# Patient Record
Sex: FEMALE | Race: WHITE | NOT HISPANIC OR LATINO | Employment: FULL TIME | URBAN - NONMETROPOLITAN AREA
[De-identification: names, ages, dates, MRNs, and addresses within clinical notes are randomized per-mention and may not be internally consistent; named-entity substitution may affect disease eponyms.]

---
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
## 2018-01-03 NOTE — TELEPHONE ENCOUNTER
Patient Information     Patient Name MRSneha Ferro [id number] Female 2000      Telephone Encounter by Suad Velásquez at 3/3/2017 12:17 PM     Author:  Suad Velásquez  Service:  (none) Author Type:  (none)     Filed:  3/3/2017 12:22 PM  Encounter Date:  3/3/2017 Status:  Addendum     :  Suad Velásquez        Related Notes: Original Note by Suad Velásquez filed at 3/3/2017 12:20 PM            Having period now, can she get appointment on Monday for IUD placement?  Did explain to patient that Maria Eugenia Fall MD is only here Monday morning next week so this may not happen.   Suad Velásquez LPN .......3/3/2017 12:18 PM

## 2018-01-03 NOTE — NURSING NOTE
Patient Information     Patient Name Sneha Murry [id number] Female 2000      Nursing Note by Tyra Masterson at 2017  8:45 AM     Author:  Tyra Masterson Service:  (none) Author Type:  (none)     Filed:  2017  8:57 AM Encounter Date:  2017 Status:  Signed     :  Tyra Masterson            Patient here today for yearly physical. Would like to discuss an IUD. Is due for period this week and unsure if she should wait or not  Tyra Masterson LPN..............................2017  8:48 AM

## 2018-01-03 NOTE — PATIENT INSTRUCTIONS
Patient Information     Patient Name Sneha Murry [id number] Female 2000      Patient Instructions by Maria Eugenia Fall MD at 2017  7:55 AM     Author:  Maria Eugenia Fall MD  Service:  (none) Author Type:  Physician     Filed:  2017  9:25 AM  Encounter Date:  2017 Status:  Addendum     :  Maria Eugenia Fall MD (Physician)        Related Notes: Original Note by Maria Eugenia Fall MD (Physician) filed at 2017  9:06 AM            PRIOR TO IUD INSERTION:  1. Call when your period starts  2.  About 2 hours before your appointment:  Take 2 aleve       And there will be a medication to place in your vaginal/behind your cervix      Growth Percentiles  Weight: 68 %ile based on CDC 2-20 Years weight-for-age data using vitals from 2017.  Height: 62 %ile based on CDC 2-20 Years stature-for-age data using vitals from 2017.  BMI: Body mass index is 22.43 kg/(m^2). 63 %ile based on CDC 2-20 Years BMI-for-age data using vitals from 2017.     Health and Wellness: 12 to 18 Years    Immunizations (Shots) Today  Your child may be eligible for:     MCV4 (meningococcal conjugate vaccine, quadrivalent): ages 11 to 12 years and age 16 years    HPV (human papilloma virus vaccine)    2 dose series: ages 11 to 14 years    3 dose series: ages 15 to 26 years    Talk with your health care provider for information about giving acetaminophen (Tylenol ) before and after your child s immunizations.    Development    All aspects of your child s development (physical, social and mental skills) will continue to grow.    Your child may have questions or concerns about puberty and sexual health. Girls will need to be prepared for menstruation.    Friendships will become more important. Peer pressure may begin or continue.    Limit your child to 1 to 2 hours of quality screen time each day, according to the American Academy of Pediatrics (AAP). Screen time  includes television, video game and computer use. Watch TV with your child and supervise Internet use (including social networking sites).    The AAP advises keeping TVs, video games and computers out of children s bedrooms.    Continue a routine for talking about school and doing homework. The AAP advises you not let your child watch TV while doing homework.    Encourage your child to read for pleasure.       Teach your child respect for property and other people.    Give your child opportunities for independence within set boundaries.    Talk honestly with your child about responsibilities and expectations around: school and homework, dating, driving, activities outside of school, keeping a job.    Diet    Children ages 12 to 18 need between 1,600 to 2,500 calories each day. (Active children need more.) A total of 25 to 35 percent of total calories should come from fats.    Between ages 12 to 18 years, your child needs 1,300 milligrams (mg) of calcium each day. She can get this requirement by drinking 3 cups of low-fat or fat-free milk, plus servings of other foods high in calcium (such as yogurt, cheese, orange juice with added calcium, broccoli, soy milk with added calcium and almonds) or by taking a calcium supplement.    Your child needs at least 600 IU of vitamin D daily.    Between ages 12 to 13 years, boys and girls need 8 mg of iron a day. After age 13, the recommended requirement changes:    boys 14 to 18 years: 11 mg    girls 14 to 18 years: 15 mg     Lean beef, iron-fortified cereal, oatmeal, soybeans, spinach and tofu are good sources of iron.    Breakfast is important. Make sure your child eats a healthful breakfast every morning.    Help your child choose fiber-rich fruits, vegetables and whole grains. Choose and prepare foods and beverages with little added sugars or sweeteners.    Offer your child healthful snacks such as fruits, vegetables, healthful cereals, yogurt, pudding, turkey, peanut butter  sandwich, fruit smoothie, or cheese. Avoid foods high in sugar or fat.    Limit soft drinks and sweetened beverages (including juice) to no more than one a day. Limit sweets, treats, snack foods (such as chips), fast foods and fried foods.    Exercise    The American Heart Association recommends children get 60 minutes of moderate to vigorous physical activity each day. If your child s school does not offer regular physical education classes, organize daily family activities (such as walking or bike riding) or consider enrolling him or her in classes, team sports, or community education activities.    In addition to helping build strong bones and muscles, regular exercise can reduce risks of certain diseases, reduce stress levels, increase self-esteem, help maintain a healthy weight, improve concentration, and help maintain good cholesterol levels.    Even if your child doesn t think it s  cool,  she needs to wear the right safety gear for her activities, such as a helmet, mouth guard, knee pads, eye protection or life vest.     You can find more information on health and wellness for children and teens at healthMValve technologiesds.org.    Sleep    Children ages 12 to 18 need at least 9   hours of sleep each night on a regular basis.    Your child should continue a sleep routine (such as washing her face and brushing teeth).    It is still important to keep a regular sleep and waking schedule. Teach your child to get up when called or when the alarm goes off.    Avoid regular exercise, heavy meals and caffeine right before bed.  Safety    Your child needs to be in a belt-positioning booster seat in the back seat until she reaches the height of 4 feet 9 inches or taller.     Once your child is 4 feet 9 inches or taller, your child can be buckled in the back seat with a lap and shoulder belt. The lap belt must lie snugly across the upper thighs, not the stomach. The shoulder belt should lie snugly across the shoulder and  chest and not cross the neck or face.     Keep your child in the back seat at least through age 12.     At 13 years old, your child may ride in the front seat, buckled with a lap and shoulder belt. (Follow directions from your health care provider.) Be sure all other adults and children are buckled as well.    Do not let anyone smoke in your home or around your child.    Talk with your child about the dangers of alcohol, drug and tobacco use.    Make sure your child understands safety guidelines for fire, water, animal safety, firearms, social networking Internet sites, and personal safety (including dating). About one in five high school girls has been physically or sexually abused by a dating partner, according to the American Medical Association.     Self-esteem    Provide support, attention and enthusiasm for your child s abilities, achievements and school activities. Show your child affection.    Get to know your child s friends and their parents.    Let your child try new skills.       Older teenagers may want to begin dating. Set boundaries and talk honestly with your child about your family s values and morals.    Monitor your child for eating disorders. Medical illnesses, they involve abnormal eating behaviors serious enough to cause heart conditions, kidney failure and death. The three most common eating disorders are anorexia nervosa, bulimia nervosa and binge eating disorder. They often develop during adolescent years or early adulthood. The vast majority of people with eating disorders are teenage girls and young women.     For information on how to stress less and help teens live a more balanced life, check out www.changetochill.org.    Discipline    Teach your child consequences for unacceptable or inappropriate behavior. Talk about your family s values and morals and what is right and wrong.    Use discipline to teach, not punish. Be fair and consistent with discipline.    Never shake or hit your  child. If you think you are losing control, make sure your child is safe and take a 10-minute time out. If you are still not calm, call a friend, neighbor or relative to come over and help you. If you have no other options, call [facility] at [phone] or dial 211.    Dental Care    Make sure your child brushes her teeth twice a day and flosses once a day.    Make regular dental appointments for cleanings and checkups.    Your child may be self-conscious if she has crooked teeth. An orthodontist can talk with you about choices for straightening teeth.    Eye Care    Make eye checkups at least every 2 years.       Lab Work  Your child should have the following once between ages 13 to 16 years    Urinalysis - This is a urine test to look for kidney problems, diabetes and/or infection    Hemoglobin - This is a blood test to check for anemia, or low blood iron  Your child should have the following once between ages 17 to 19 years    Cholesterol level - This is a blood test to measure a fat-like substance in the blood.  High total cholesterol can indicate a risk for future heart problem    Next Well Checkup    Your child should have a yearly well checkup through age 20.    Your child may need these shots:     Influenza    For more information go to www.healthychildren.org       2013 [employer]  AND THE [employer] LO[employer] ARE REGISTERED [employer]ARKS OF [employer]  OTHER TRADEMARKS USED ARE OWNED BY THEIR RESPECTIVE OWNERS  [id number] (5/12)

## 2018-01-03 NOTE — TELEPHONE ENCOUNTER
Patient Information     Patient Name MRSneha Ferro [id number] Female 2000      Telephone Encounter by Tyra Masterson at 2017  8:59 AM     Author:  Tyra Masterson Service:  (none) Author Type:  (none)     Filed:  2017  9:00 AM Encounter Date:  2017 Status:  Signed     :  Tyra Masterson?  Tyra Masterson LPN..............................2017  9:00 AM

## 2018-01-03 NOTE — TELEPHONE ENCOUNTER
Patient Information     Patient Name Sneha Murry [id number] Female 2000      Telephone Encounter by Elisabeth Aponte at 3/3/2017 11:08 AM     Author:  Elisabeth Aponte Service:  (none) Author Type:  (none)     Filed:  3/3/2017 11:09 AM Encounter Date:  3/3/2017 Status:  Signed     :  Elisabeth Aponte called and said that she now has her period and is wondering about getting in with Dr. Fall to get an IUD placed.  Please call her.  Elisabeth Aponte ....................  3/3/2017   11:09 AM

## 2018-01-03 NOTE — TELEPHONE ENCOUNTER
Patient Information     Patient Name Sneha Murry [id number] Female 2000      Telephone Encounter by Tyra Masterson at 3/6/2017  8:22 AM     Author:  Tyra Masterson Service:  (none) Author Type:  (none)     Filed:  3/6/2017  8:23 AM Encounter Date:  3/3/2017 Status:  Signed     :  Tyra Masterson            Patient unable to come in today for appointment. Will call us back next cycle  Tyra Masterson LPN..............................3/6/2017  8:23 AM

## 2018-01-03 NOTE — TELEPHONE ENCOUNTER
Patient Information     Patient Name Sneha Murry [id number] Female 2000      Telephone Encounter by Tyra Masterson at 2/3/2017  8:54 AM     Author:  Tyra Masterson Service:  (none) Author Type:  (none)     Filed:  2/3/2017  8:56 AM Encounter Date:  2017 Status:  Signed     :  Tyra Masterson            Patient states she is on her period now but unable to come in. Will call us when she gets it again so we can get her in during period as she states Maria Eugenia Fall MD told her this would be better.  Tyra Masterson LPN..............................2/3/2017  8:55 AM

## 2018-01-03 NOTE — PROGRESS NOTES
Patient Information     Patient Name MRN Sneha Corley [id number] Female 2000      Progress Notes by Maria Eugenia Fall MD at 2017  7:55 AM     Author:  Maria Eugenia Fall MD Service:  (none) Author Type:  Physician     Filed:  2017  2:17 PM Encounter Date:  2017 Status:  Signed     :  Mraia Eugenia Fall MD (Physician)              DEVELOPMENT  Social:     enjoys school: yes; [employer]    performance consistent: yes    interaction with peers: yes  Fine Motor:     able to complete age specific tasks: yes  Language:     communication skills are normal: yes  Gross Motor:     normal: yes    participates in extracurricular activities: works about 20-25 hours/week  Answers provided by: pt  Above information obtained by:  Maria Eugenia Fall MD     [facility]   Sneha Bassett is a 16 y.o. female here for a Well Child Exam. She is brought here by her mother. Concerns raised today include right eye drainage, possible IUD. Nursing notes reviewed: yes    DEVELOPMENT  This child's development was assessed today and the results showed normal development    COMPLETE REVIEW OF SYSTEMS  General: Normal; no fever, no loss of appetite, no change in activity level.  Eyes: right eye drainage last night  Ears: Normal; No concerns about ears or hearing  Nose: URI symptoms   Throat: Normal; No concerns about mouth and throat  Respiratory: Normal; no persistent coughing, wheezing, or troubled breathing.  Cardiovascular: Normal; no excessive fatigue or syncope with activity   GI: Normal; BMs normal.  Genitourinary: LMP 12-31, 5-6 days of bleeding  Musculoskeletal: Normal; no concerns.  Neuro: Normal; no abnormal movements    Skin: Normal; no rashes or lesions noted   Psych: no symptoms of anxiety, no symptoms of eating disorders, no abuse concerns and pt denies substance use or abuse  PHQ Depression Screening 2017   Date of PHQ exam (doc flow) 2017   1. Lack of  interest/pleasure 0 - Not at all   2. Feeling down/depressed 0 - Not at all   PHQ-2 TOTAL SCORE 0   3. Trouble sleeping -   4. Decreased energy -   5. Appetite change -   6. Feelings of failure -   7. Trouble concentrating -   8. Activity level -   9. Hurting yourself -   PHQ-9 TOTAL SCORE -   PHQ-9 Severity Level -   Functional Impairment -       Problem List  Patient Active Problem List      Diagnosis Date Noted     Chronic abdominal pain 2016     Acne vulgaris 2015     BACK PAIN 2011     Current Medications:  Current Outpatient Rx       Medication  Sig Dispense Refill     cephalexin (KEFLEX) 500 mg capsule Take 1 capsule just prior to or just after sexual activity. 8 capsule 3     fluocinolone 0.01% (SYNALAR) 0.01 % shampoo Lather 1 ounce or less to scalp once daily. Leave on for 5 minutes before rinsing. 120 mL 0     hyoscyamine Sulfate (LEVSIN ODT) 0.125 mg tablet Place 1 tablet on the tongue every 4 hours if needed for Bladder Spasms.  0     tretinoin (RETIN-A) 0.025 % 0.025 % cream Apply  topically to affected area(s) at bedtime. 45 g 11     Medications have been reviewed by me and are current to the best of my knowledge and ability.     Histories  Past Medical History      Diagnosis   Date     Body image problem  5/15/2013     Chronic abdominal pain  2016     Hx of delivery  2000     Born  birth due to failure to progress.  Birth weight 7 pounds 4 ounces.      OM (otitis media), acute  2005     R AOM       Sexually active       Family History       Problem   Relation Age of Onset     Heart Disease  Paternal Grandmother      MI.       Heart Disease  Paternal Grandfather      Heart problems.       Thyroid Disease  Paternal Grandmother      Thyroid       Social History     Social History        Marital status:  Single     Spouse name: N/A     Number of children:  0     Years of education:  11     Occupational History        student       [employer]      [employer]     Social History Main Topics        Smoking status:  Never Smoker     Smokeless tobacco:  Never Used     Alcohol use  No     Drug use:  No     Sexual activity:  Yes     Partners: Male     Birth control/ protection: Condom     Other Topics  Concern     Stress Concern Yes     Social History Narrative     Parents are .  Attends [employer]    José Father.    Connie Mother    Gerardo Sister, born 1/03       Past Surgical History       Procedure   Laterality Date     Tympanostomy   1/2006     PE tube placement        Family, Social, and Medical/Surgical history reviewed: yes  Allergies: Review of patient's allergies indicates no known allergies.     Immunization Status  Immunization Status Reviewed: yes  Immunizations up to date: yes  Counseled parent about risks and benefits of Menactra and hepatitis A vaccines    PHYSICAL EXAM  /60  Pulse 72  Ht 1.651 m (5' 5\")  Wt 61.1 kg (134 lb 12.8 oz)  LMP 12/31/2016  BMI 22.43 kg/m2  Growth Percentiles  Length: 62 %ile based on CDC 2-20 Years stature-for-age data using vitals from 1/18/2017.   Weight: 68 %ile based on CDC 2-20 Years weight-for-age data using vitals from 1/18/2017.   Weight for length: Normalized weight-for-recumbent length data not available for patients older than 36 months.  BMI: Body mass index is 22.43 kg/(m^2).  BMI for age: 63 %ile based on CDC 2-20 Years BMI-for-age data using vitals from 1/18/2017.    GENERAL: Normal; alert, interactive, well developed adolescent.   HEAD: Normal; normal shaped head.   EYES: \"Normal; Pupils equal, round and reactive to light  EARS: Normal; normally formed ears. TMs normal.  NOSE: Normal; no significant rhinorrhea.  OROPHARYNX:  Normal; mouth and throat normal. Normal dentition.  NECK: Normal; supple, no masses.  LYMPH NODES: Normal.  CHEST: Normal; normal to inspection.  LUNGS: Normal; no wheezes, rales, rhonchi or retractions. Breath " sounds symmetrical.  CARDIOVASCULAR: Normal; no murmurs noted  ABDOMEN: Normal; soft, nontender, without masses. No enlargement of liver or spleen.  HIPS: Normal.  SPINE: Normal; no curvature.  EXTREMITIES: Normal.  SKIN: Normal; no rashes, normal color.  NEURO: Normal; grossly intact, no focal deficits.  PSYCH: Summer is alert and oriented, affect appropriate to content of speech and circumstances, mood appropriate.    ANTICIPATORY GUIDANCE  Written standard Anticipatory Guidance material given to caregiver. yes     ASSESSMENT/PLAN:    Well 16 y.o. adolescent with normal growth and normal development.   Patient's BMI is 63 %ile based on CDC 2-20 Years BMI-for-age data using vitals from 1/18/2017. Counseling about nutrition and physical activity provided to patient and/or parent.     ICD-10-CM    1. Encounter for routine child health examination without abnormal findings Z00.129 MENINGOCOCCAL (MENACTRA) VACC IM      HEP A VACC PED/ADOL 2 DOSE IM      GC CHLAMYDIA TRACH PROBE      CT ADMIN VACC INITIAL      CT ADMIN EA ADDL VACC      GC CHLAMYDIA TRACH PROBE     Menactra and kept is a vaccines administered today.  GC chlamydia screening obtained today.  Discussed contraception options. She would like to proceed with a copper IUD. We'll plan to insert this near the end of her next period. She'll contact the clinic with the date of onset of her menses. I would recommend to naproxen prior to her visit and vaginal Cytotec.  Schedule next well visit at 17 years of age.  Maria Eugenia Fall MD

## 2018-01-03 NOTE — TELEPHONE ENCOUNTER
Patient Information     Patient Name MRN Sneha Corley [id number] Female 2000      Telephone Encounter by Maria Eugenia Fall MD at 2017  4:45 PM     Author:  Maria Eugenia Fall MD Service:  (none) Author Type:  Physician     Filed:  2017  4:45 PM Encounter Date:  2017 Status:  Signed     :  Maria Eugenia Fall MD (Physician)            Baltazar Fall MD

## 2018-01-03 NOTE — TELEPHONE ENCOUNTER
Patient Information     Patient Name Sneha Murry [id number] Female 2000      Telephone Encounter by Aniyah Maritns at 2017  8:54 AM     Author:  Aniyah Martins Service:  (none) Author Type:  (none)     Filed:  2017  8:57 AM Encounter Date:  2017 Status:  Signed     :  Aniyah Martins            Patient called and is wondering if [facility] would be willing to work her in for an IUD placement before 2017.  She is wondering if Tyra could give her a call back.  Thank you!     Aniyah Martins ....................  2017   8:56 AM

## 2018-01-27 VITALS
DIASTOLIC BLOOD PRESSURE: 60 MMHG | SYSTOLIC BLOOD PRESSURE: 110 MMHG | BODY MASS INDEX: 22.46 KG/M2 | WEIGHT: 134.8 LBS | HEART RATE: 72 BPM | HEIGHT: 65 IN

## 2018-01-27 VITALS
HEART RATE: 68 BPM | DIASTOLIC BLOOD PRESSURE: 68 MMHG | SYSTOLIC BLOOD PRESSURE: 110 MMHG | WEIGHT: 130 LBS | BODY MASS INDEX: 21.97 KG/M2

## 2018-02-13 ENCOUNTER — DOCUMENTATION ONLY (OUTPATIENT)
Dept: FAMILY MEDICINE | Facility: OTHER | Age: 18
End: 2018-02-13

## 2018-02-13 PROBLEM — Z97.5 IUD (INTRAUTERINE DEVICE) IN PLACE: Status: ACTIVE | Noted: 2017-08-01

## 2018-02-13 PROBLEM — Z30.014 ENCOUNTER FOR INITIAL PRESCRIPTION OF INTRAUTERINE CONTRACEPTIVE DEVICE: Status: ACTIVE | Noted: 2017-02-01

## 2018-03-05 ENCOUNTER — HEALTH MAINTENANCE LETTER (OUTPATIENT)
Age: 18
End: 2018-03-05

## 2018-04-23 ENCOUNTER — TELEPHONE (OUTPATIENT)
Dept: FAMILY MEDICINE | Facility: OTHER | Age: 18
End: 2018-04-23

## 2018-04-23 NOTE — TELEPHONE ENCOUNTER
Patient was transferred to appointment line.    Kareen Abdalla LPN.................. 4/23/2018 12:14 PM

## 2018-04-23 NOTE — TELEPHONE ENCOUNTER
Please call her [phone]--this is 2nd reschedule for her px---was in dec 2017 and 4/24/2018---she really needs to get in sooner than 5/16/2018.  thanks

## 2018-05-21 ENCOUNTER — OFFICE VISIT (OUTPATIENT)
Dept: FAMILY MEDICINE | Facility: OTHER | Age: 18
End: 2018-05-21
Attending: FAMILY MEDICINE
Payer: COMMERCIAL

## 2018-05-21 VITALS
SYSTOLIC BLOOD PRESSURE: 110 MMHG | DIASTOLIC BLOOD PRESSURE: 66 MMHG | WEIGHT: 135 LBS | BODY MASS INDEX: 22.49 KG/M2 | HEART RATE: 80 BPM | HEIGHT: 65 IN

## 2018-05-21 DIAGNOSIS — M54.50 CHRONIC LOW BACK PAIN WITHOUT SCIATICA, UNSPECIFIED BACK PAIN LATERALITY: ICD-10-CM

## 2018-05-21 DIAGNOSIS — Z97.5 IUD (INTRAUTERINE DEVICE) IN PLACE: ICD-10-CM

## 2018-05-21 DIAGNOSIS — K59.1 FUNCTIONAL DIARRHEA: ICD-10-CM

## 2018-05-21 DIAGNOSIS — Z00.00 PHYSICAL EXAM, ANNUAL: Primary | ICD-10-CM

## 2018-05-21 DIAGNOSIS — G89.29 CHRONIC LOW BACK PAIN WITHOUT SCIATICA, UNSPECIFIED BACK PAIN LATERALITY: ICD-10-CM

## 2018-05-21 DIAGNOSIS — M67.471 GANGLION CYST OF RIGHT FOOT: ICD-10-CM

## 2018-05-21 PROBLEM — Z30.014 ENCOUNTER FOR INITIAL PRESCRIPTION OF INTRAUTERINE CONTRACEPTIVE DEVICE: Status: RESOLVED | Noted: 2017-02-01 | Resolved: 2018-05-21

## 2018-05-21 PROCEDURE — 99395 PREV VISIT EST AGE 18-39: CPT | Performed by: FAMILY MEDICINE

## 2018-05-21 ASSESSMENT — PAIN SCALES - GENERAL: PAINLEVEL: MODERATE PAIN (4)

## 2018-05-21 NOTE — NURSING NOTE
Patient presents to the clinic today for a px. She complains of low back/left side pain. She also has a lump on her right foot she would like checked.       Kareen Abdalla LPN.................. 5/21/2018 11:01 AM

## 2018-05-21 NOTE — MR AVS SNAPSHOT
After Visit Summary   5/21/2018    Sneha Bassett    MRN: [id number]           Patient Information     Date Of Birth          2000        Visit Information        Provider Department      5/21/2018 10:45 AM Maria Eugenia Bai MD [facility]        Today's Diagnoses     Physical exam, annual    -  1    Functional diarrhea        Ganglion cyst of right foot        Chronic low back pain without sciatica, unspecified back pain laterality        IUD (intrauterine device) in place           Follow-ups after your visit        Additional Services     ORTHOPEDICS ADULT REFERRAL       Your provider has referred you to: PREFERRED PROVIDERS:    Please be aware that coverage of these services is subject to the terms and limitations of your health insurance plan.  Call member services at your health plan with any benefit or coverage questions.      Please bring the following to your appointment:    >>   Any x-rays, CTs or MRIs which have been performed.  Contact the facility where they were done to arrange for  prior to your scheduled appointment.    >>   List of current medications   >>   This referral request   >>   Any documents/labs given to you for this referral                  Who to contact     If you have questions or need follow up information about today's clinic visit or your schedule please contact [facility] directly at [phone].  Normal or non-critical lab and imaging results will be communicated to you by MyChart, letter or phone within 4 business days after the clinic has received the results. If you do not hear from us within 7 days, please contact the clinic through MyChart or phone. If you have a critical or abnormal lab result, we will notify you by phone as soon as possible.  Submit refill requests through Flare Code or call your pharmacy and they will forward the refill request to us. Please allow 3 business days for your  "refill to be completed.          Additional Information About Your Visit        Naked WinesharUClass Information     ENDOGENX lets you send messages to your doctor, view your test results, renew your prescriptions, schedule appointments and more. To sign up, go to [email or web address] . Click on \"Log in\" on the left side of the screen, which will take you to the Welcome page. Then click on \"Sign up Now\" on the right side of the page.     You will be asked to enter the access code listed below, as well as some personal information. Please follow the directions to create your username and password.     Your access code is: [email or web address]  Expires: 2018  3:39 PM     Your access code will  in 90 days. If you need help or a new code, please call your [place] clinic or [phone].        Care EveryWhere ID     This is your Care EveryWhere ID. This could be used by other organizations to access your [place] medical records  [id number]        Your Vitals Were     Pulse Height Last Period Breastfeeding? BMI (Body Mass Index)       80 5' 5\" (1.651 m) 2018 No 22.47 kg/m2        Blood Pressure from Last 3 Encounters:   18 110/66   17 110/68   17 110/60    Weight from Last 3 Encounters:   18 135 lb (61.2 kg) (63 %)*   17 130 lb (59 kg) (58 %)*   17 134 lb 12.8 oz (61.1 kg) (68 %)*     * Growth percentiles are based on CDC 2-20 Years data.              We Performed the Following     ORTHOPEDICS ADULT REFERRAL        Primary Care Provider Office Phone # Fax #    Maria Eugenia Cho [phone] [address] [facility] [address]        Equal Access to Services     ELDER CARRERA : Shankar Schroeder, wastacey brambila, qaybta kaalbrittanie drew, marielos lindo. So [facility] [phone].    ATENCIÓN: Si habla español, tiene a banks disposición servicios gratuitos de asistencia lingüística. Llame al [phone].    We " comply with applicable federal civil rights laws and [place] laws. We do not discriminate on the basis of race, color, national origin, age, disability, sex, sexual orientation, or gender identity.            Thank you!     Thank you for choosing [facility]  for your care. Our goal is always to provide you with excellent care. Hearing back from our patients is one way we can continue to improve our services. Please take a few minutes to complete the written survey that you may receive in the mail after your visit with us. Thank you!             Your Updated Medication List - Protect others around you: Learn how to safely use, store and throw away your medicines at www.disposemymeds.org.          This list is accurate as of 5/21/18  3:39 PM.  Always use your most recent med list.                   Brand Name Dispense Instructions for use Diagnosis    COPPER PO      1 Device by Intrauterine route

## 2018-05-21 NOTE — PROGRESS NOTES
SUBJECTIVE:    Sneha Bassett is a 17 year old female who presents for her annual exam.  Nursing Notes:   Kareen Abdalla LPN  5/21/2018 11:06 AM  Unsigned  Patient presents to the clinic today for a px. She complains of low back/left side pain. She also has a lump on her right foot she would like checked.       Kareen Abdalla LPN.................. 5/21/2018 11:01 AM    HPI: Sneha Bassett is a 17 year old female presents for her annual exam.  Currently, not on an prescription medications.    Last pap: due age 19  Immunizations:  Up to date   Mammogram at age 43  Colon cancer screening at age 48  Current birth control IUD, condoms; she's had 4 partners in the past 12 months  She had chlamydia last fall and has had recheck x2 that are negative, most recent check was about 2 weeks ago          PROBLEM LIST:  Patient Active Problem List   Diagnosis     Acne vulgaris     Backache     Chronic abdominal pain     IUD (intrauterine device) in place     Ganglion cyst of right foot       PAST MEDICAL HISTORY:  Past Medical History:   Diagnosis Date     Abdominal pain     2/22/2016     Chlamydia 2017     Presence of (intrauterine) contraceptive device     08/2017,copper     SURGICAL HISTORY:  Past Surgical History:   Procedure Laterality Date     TYMPANOSTOMY, LOCAL/TOPICAL ANESTHESIA  2006 1/2006,PE tube placement       SOCIAL HISTORY:  Social History     Social History     Marital status: Single     Spouse name: N/A     Number of children: 0     Years of education: 13     Occupational History      [employer]     Student      Social History Main Topics     Smoking status: Never Smoker     Smokeless tobacco: Never Used     Alcohol use 0.0 oz/week     Drug use: No      Comment: Drug use: No     Sexual activity: Yes     Partners: Male     Birth control/ protection: Condom, IUD     Other Topics Concern     Not on file     Social History Narrative    Parents are .  [facility] [employer] [facility] Father.  Connie  "Mother  Gerardo Sister, born 1/03     FAMILY HISTORY:    Family History   Problem Relation Age of Onset     No Known Problems Sister      HEART DISEASE Paternal Grandmother      Heart Disease,MI.     Thyroid Disease Paternal Grandmother      Thyroid Disease,Thyroid     HEART DISEASE Paternal Grandfather      Heart Disease,Heart problems.       CURRENT MEDICATIONS:   Current Outpatient Prescriptions   Medication Sig Dispense Refill     COPPER PO 1 Device by Intrauterine route       ALLERGIES:   Not on File      REVIEW OF SYSTEMS:  General: denies any general problems.  Eyes: denies problems  Ears/Nose/Throat: denies problems, last dentist visit was 1.5 months ago  Respiratory: denies problems  Cardiovascular: denies problems  :  See above; 2 days before and first 3 days of her periods she takes ibuprofen for cramps  GI diarrhea, loose and cramps for the past month, no bleeding; dairy is a trigger; no travel   Musculoskeletal: back pain - thinks it is posture, stress - was worried it was an STD  Skin: birthmark right thigh    Neurologic: denies problems  Psychiatric: denies problems  Endocrine: denies problems  Heme/Lymphatic: denies problems  Allergic/Immunologic: deniesproblems    PHQ-2 Score:     PHQ-2 ( 1999 Pfizer) 5/21/2018   Q1: Little interest or pleasure in doing things 0   Q2: Feeling down, depressed or hopeless 0   PHQ-2 Score 0         OBJECTIVE:  /66 (BP Location: Right arm, Patient Position: Sitting, Cuff Size: Adult Regular)  Pulse 80  Ht 5' 5\" (1.651 m)  Wt 135 lb (61.2 kg)  LMP 05/18/2018  Breastfeeding? No  BMI 22.47 kg/m2   EXAM:   General Appearance: Pleasant, alert, appropriate appearance for age. No acute distress  Head Exam: Normal. Normocephalic, atraumatic.  Eye Exam:Normal external eye, conjunctiva, lids, cornea. LALO.  Ear Exam: Normal TM's bilaterally. Normal auditory canals and external ears. Non-tender.  Nose Exam: Normal external nose, mucus membranes, and " septum.  OroPharynx Exam:  Dental hygiene adequate. Normal buccal mucose. Normal pharynx.  Neck Exam:  Supple, no masses or nodes.  Thyroid Exam: No nodules or enlargement.  Chest/Respiratory Exam: Normal chest wall andrespirations. Clear to auscultation.  Breast Exam: No dimpling, nipple retraction or discharge. No masses or nodes.  Cardiovascular Exam: Regular rate and rhythm. S1, S2, no murmur, click, gallop, or rubs.  Gastrointestinal Exam: Soft, non-tender, no masses or organomegaly  Musculoskeletal Exam: Back is straight and feels better with pressure/palpation  Foot Exam: right foot with two cysts of mid foot - slightly tender  Skin: birthmark right thigh  Neurologic Exam: Nonfocal, symmetric DTRs, normal gross motor, tone coordination and no tremor.  Psychiatric Exam: Alertand oriented - appropriate affect.        ASSESSMENT/PLAN    ICD-10-CM    1. Physical exam, annual Z00.00    2. Functional diarrhea K59.1    3. Ganglion cyst of right foot M67.471 ORTHOPEDICS ADULT REFERRAL   4. Chronic low back pain without sciatica, unspecified back pain laterality M54.5     G89.29    5. IUD (intrauterine device) in place Z97.5        1.  Discussed STD testing with new partners.  2.  Discussed anxiety and physical symptoms.  3.  Referral to orthopedics to discuss cyst treatment.        Maria Eugenia Fall MD

## 2018-06-13 ENCOUNTER — OFFICE VISIT (OUTPATIENT)
Dept: FAMILY MEDICINE | Facility: OTHER | Age: 18
End: 2018-06-13
Attending: PHYSICIAN ASSISTANT
Payer: COMMERCIAL

## 2018-06-13 VITALS
HEART RATE: 60 BPM | BODY MASS INDEX: 22.63 KG/M2 | DIASTOLIC BLOOD PRESSURE: 70 MMHG | WEIGHT: 136 LBS | SYSTOLIC BLOOD PRESSURE: 112 MMHG

## 2018-06-13 DIAGNOSIS — R22.1 LUMP IN NECK: Primary | ICD-10-CM

## 2018-06-13 LAB
BASOPHILS # BLD AUTO: 0 10E9/L (ref 0–0.2)
BASOPHILS NFR BLD AUTO: 0.6 %
DIFFERENTIAL METHOD BLD: NORMAL
EOSINOPHIL # BLD AUTO: 0 10E9/L (ref 0–0.7)
EOSINOPHIL NFR BLD AUTO: 0.6 %
ERYTHROCYTE [DISTWIDTH] IN BLOOD BY AUTOMATED COUNT: 12.7 % (ref 10–15)
HCT VFR BLD AUTO: 39.4 % (ref 35–47)
HGB BLD-MCNC: 13.1 G/DL (ref 11.7–15.7)
IMM GRANULOCYTES # BLD: 0 10E9/L (ref 0–0.4)
IMM GRANULOCYTES NFR BLD: 0.2 %
LYMPHOCYTES # BLD AUTO: 1.5 10E9/L (ref 0.8–5.3)
LYMPHOCYTES NFR BLD AUTO: 27.9 %
MCH RBC QN AUTO: 30.2 PG (ref 26.5–33)
MCHC RBC AUTO-ENTMCNC: 33.2 G/DL (ref 31.5–36.5)
MCV RBC AUTO: 91 FL (ref 78–100)
MONOCYTES # BLD AUTO: 0.8 10E9/L (ref 0–1.3)
MONOCYTES NFR BLD AUTO: 14.2 %
NEUTROPHILS # BLD AUTO: 3.1 10E9/L (ref 1.6–8.3)
NEUTROPHILS NFR BLD AUTO: 56.5 %
PLATELET # BLD AUTO: 178 10E9/L (ref 150–450)
RBC # BLD AUTO: 4.34 10E12/L (ref 3.8–5.2)
WBC # BLD AUTO: 5.4 10E9/L (ref 4–11)

## 2018-06-13 PROCEDURE — 99213 OFFICE O/P EST LOW 20 MIN: CPT | Performed by: PHYSICIAN ASSISTANT

## 2018-06-13 PROCEDURE — 36415 COLL VENOUS BLD VENIPUNCTURE: CPT | Performed by: PHYSICIAN ASSISTANT

## 2018-06-13 PROCEDURE — 85025 COMPLETE CBC W/AUTO DIFF WBC: CPT | Performed by: PHYSICIAN ASSISTANT

## 2018-06-13 NOTE — PROGRESS NOTES
Nursing Notes:   Fanny Newman LPN  6/13/2018  2:15 PM  Signed  Patient presents to clinic with c/o lump on left side of neck.  Kylie Paty DEJESUS ...... 6/13/2018 2:07 PM        HPI:    Sneha Bassett is a 17 year old female who presents for lump on left side of neck. Left lateral neck lump. Size of a peanut. Noticed last week. Hx cysts on feet. Not tender. Pain with pushing around it. No drainage.  No cough or cold symptoms.  No sinus pain, pressure, ear pain, sore throat, cough, GI symptoms, urinary symptoms.  No fevers or chills.    Past Medical History:   Diagnosis Date     Abdominal pain     2/22/2016     Chlamydia 2017     Presence of (intrauterine) contraceptive device     08/2017,copper       Past Surgical History:   Procedure Laterality Date     TYMPANOSTOMY, LOCAL/TOPICAL ANESTHESIA  2006 1/2006,PE tube placement       Family History   Problem Relation Age of Onset     No Known Problems Sister      HEART DISEASE Paternal Grandmother      Heart Disease,MI.     Thyroid Disease Paternal Grandmother      Thyroid Disease,Thyroid     HEART DISEASE Paternal Grandfather      Heart Disease,Heart problems.       Social History     Social History     Marital status: Single     Spouse name: N/A     Number of children: 0     Years of education: 13     Occupational History      [employer]     Student      Social History Main Topics     Smoking status: Never Smoker     Smokeless tobacco: Never Used     Alcohol use 0.0 oz/week     Drug use: No      Comment: Drug use: No     Sexual activity: Yes     Partners: Male     Birth control/ protection: Condom, IUD     Other Topics Concern     Not on file     Social History Narrative    Parents are .  Attends [employer].  José Father.  Connie Mother  Gerardo Sister, born 1/03       Current Outpatient Prescriptions   Medication Sig Dispense Refill     COPPER PO 1 Device by Intrauterine route         No Known Allergies    REVIEW OF SYSTEMS:  Refer to  HPI.    EXAM:   Vitals:    /70 (BP Location: Right arm, Patient Position: Sitting, Cuff Size: Adult Regular)  Pulse 60  Wt 136 lb (61.7 kg)  LMP 05/18/2018  BMI 22.63 kg/m2    General Appearance: Pleasant, alert, appropriate appearance for age. No acute distress  Ear Exam: Normal TM's bilaterally, normal grey, and translucent. Normal auditory canals and external ears. Non-tender.   OroPharynx Exam: Dental hygiene adequate. Normal buccal mucosa. Normal pharynx.  Neck Exam:  Supple. Approx 1/2 cm nontender, mobile lump appreciated on left PCLN chain.  No overlying erythema, open wound, pus, drainage, warmth.  Chest/Respiratory Exam: Normal chest wall and respirations. Clear to auscultation.  Cardiovascular Exam: Regular rate and rhythm. S1, S2, no murmur, click, gallop, or rubs.  Skin: no rash or abnormalities  Psychiatric Exam: Alert and oriented - appropriate affect.    PHQ Depression Screen  PHQ-9 SCORE 7/24/2015 9/15/2016   Total Score 3 0       Labs:  Results for orders placed or performed in visit on 06/13/18   CBC and Differential   Result Value Ref Range    WBC 5.4 4.0 - 11.0 10e9/L    RBC Count 4.34 3.8 - 5.2 10e12/L    Hemoglobin 13.1 11.7 - 15.7 g/dL    Hematocrit 39.4 35.0 - 47.0 %    MCV 91 78 - 100 fl    MCH 30.2 26.5 - 33.0 pg    MCHC 33.2 31.5 - 36.5 g/dL    RDW 12.7 10.0 - 15.0 %    Platelet Count 178 150 - 450 10e9/L    Diff Method Automated Method     % Neutrophils 56.5 %    % Lymphocytes 27.9 %    % Monocytes 14.2 %    % Eosinophils 0.6 %    % Basophils 0.6 %    % Immature Granulocytes 0.2 %    Absolute Neutrophil 3.1 1.6 - 8.3 10e9/L    Absolute Lymphocytes 1.5 0.8 - 5.3 10e9/L    Absolute Monocytes 0.8 0.0 - 1.3 10e9/L    Absolute Eosinophils 0.0 0.0 - 0.7 10e9/L    Absolute Basophils 0.0 0.0 - 0.2 10e9/L    Abs Immature Granulocytes 0.0 0 - 0.4 10e9/L         ASSESSMENT AND PLAN:    1. Lump in neck        Patient had an unremarkable CBC.  Symptoms likely due to a lymph node.   Encouraged close monitoring.  No imaging is warranted at this time.    Encouraged to take ibuprofen or tylenol for relief up to 4 times per day.   Encouraged to use heat 15 minutes at a time several times per day to decrease pain. Return to clinic with any change or worsening of symptoms.    Monitor lump.   Return in 1 month if persistent or getting larger as repeat labs or imaging may be warranted.        Patient Instructions   Encouraged to take ibuprofen or tylenol for relief up to 4 times per day.   Encouraged to use heat 15 minutes at a time several times per day to decrease pain. Return to clinic with any change or worsening of symptoms.    Monitor lump.   Return in 1 month if persistent or getting larger.      Jenniffer Riddle PA-C..................6/13/2018 2:13 PM

## 2018-06-13 NOTE — PATIENT INSTRUCTIONS
Encouraged to take ibuprofen or tylenol for relief up to 4 times per day.   Encouraged to use heat 15 minutes at a time several times per day to decrease pain. Return to clinic with any change or worsening of symptoms.    Monitor lump.   Return in 1 month if persistent or getting larger.

## 2018-06-13 NOTE — MR AVS SNAPSHOT
"              After Visit Summary   6/13/2018    Sneha Bassett    MRN: [id number]           Patient Information     Date Of Birth          2000        Visit Information        Provider Department      6/13/2018 2:00 PM Jenniffer Riddle PA-C [facility]        Today's Diagnoses     Lump in neck    -  1      Care Instructions    Encouraged to take ibuprofen or tylenol for relief up to 4 times per day.   Encouraged to use heat 15 minutes at a time several times per day to decrease pain. Return to clinic with any change or worsening of symptoms.    Monitor lump.   Return in 1 month if persistent or getting larger.           Follow-ups after your visit        Who to contact     If you have questions or need follow up information about today's clinic visit or your schedule please contact [facility] directly at [phone].  Normal or non-critical lab and imaging results will be communicated to you by Cartourhart, letter or phone within 4 business days after the clinic has received the results. If you do not hear from us within 7 days, please contact the clinic through Cartourhart or phone. If you have a critical or abnormal lab result, we will notify you by phone as soon as possible.  Submit refill requests through MomentFeed or call your pharmacy and they will forward the refill request to us. Please allow 3 business days for your refill to be completed.          Additional Information About Your Visit        Cartourhart Information     MomentFeed lets you send messages to your doctor, view your test results, renew your prescriptions, schedule appointments and more. To sign up, go to [email or web address] . Click on \"Log in\" on the left side of the screen, which will take you to the Welcome page. Then click on \"Sign up Now\" on the right side of the page.     You will be asked to enter the access code listed below, as well as some personal information. Please follow the directions " to create your username and password.     Your access code is: [email or web address]  Expires: 2018  3:39 PM     Your access code will  in 90 days. If you need help or a new code, please call your [place] clinic or [phone].        Care EveryWhere ID     This is your Care EveryWhere ID. This could be used by other organizations to access your [place] medical records  [id number]        Your Vitals Were     Pulse Last Period BMI (Body Mass Index)             60 2018 22.63 kg/m2          Blood Pressure from Last 3 Encounters:   18 112/70   18 110/66   17 110/68    Weight from Last 3 Encounters:   18 136 lb (61.7 kg) (65 %)*   18 135 lb (61.2 kg) (63 %)*   17 130 lb (59 kg) (58 %)*     * Growth percentiles are based on [facility] 2-20 Years data.              We Performed the Following     CBC and Differential        Primary Care Provider Office Phone # Fax #    Maria Eugenia Cho [phone] [address]  [facility] [address]        Equal Access to Services     LC CARRERA AH: Hadii barbara lino Sosheryl, waaxda luqadaha, qaybta kaalmada adeegyada, marielos lindo. So [facility] [phone].    ATENCIÓN: Si habla español, tiene a banks disposición servicios gratuitos de asistencia lingüística. Llame al [phone].    We comply with applicable federal civil rights laws and [place] laws. We do not discriminate on the basis of race, color, national origin, age, disability, sex, sexual orientation, or gender identity.            Thank you!     Thank you for choosing [facility]  for your care. Our goal is always to provide you with excellent care. Hearing back from our patients is one way we can continue to improve our services. Please take a few minutes to complete the written survey that you may receive in the mail after your visit with us. Thank you!             Your Updated Medication List -  Protect others around you: Learn how to safely use, store and throw away your medicines at www.disposemymeds.org.          This list is accurate as of 6/13/18  2:24 PM.  Always use your most recent med list.                   Brand Name Dispense Instructions for use Diagnosis    COPPER PO      1 Device by Intrauterine route

## 2018-06-13 NOTE — NURSING NOTE
Patient presents to clinic with c/o lump on left side of neck.  Kylie Newman LPN ...... 6/13/2018 2:07 PM

## 2018-07-23 NOTE — PROGRESS NOTES
Patient Information     Patient Name  Sneha Bassett MRN  [id number] Sex  Female   2000      Letter by Maria Eugenia Newell MD at      Author:  Maria Eugenia Newell MD Service:  (none) Author Type:  (none)    Filed:   Encounter Date:  2017 Status:  (Other)           Sneha Bassett  [address] [facility] [address]          2017    Dear Summer,    Your chlamydia and gonorrhea tests that were done today are negative.    Sincerely,  Maria Eugenia Fall MD [facility] 2017 5:28 PM

## 2018-07-24 NOTE — PROGRESS NOTES
"Patient Information     Patient Name  Sneha Bassett MRN  [id number] Sex  Female   2000      Letter by Maria Eugenia Newell MD at      Author:  Maria Eugenia Newell MD Service:  (none) Author Type:  (none)    Filed:   Encounter Date:  2017 Status:  (Other)           Sneha Bassett  [address] [facility] [address]          2017    Dear Ms. Bassett:    Welcome to [facility]!   Remember to activate your account quickly -  Your activation code expires in 45 days!    With [employer] [facility], you can view your health information, email your provider, schedule clinic appointments, get after visit instructions and more - online, anytime.     To activate your [employer] [facility] account, you will need:    * to visit [email or web address]  * your [employer] [facility] activation code: [email or web address]  Expires: 2017  4:53 PM   * the last four digits of your Social Security number (SSN)   * your date of birth.     Step-by-step instructions on how to set up your [facility] account are shown on the following page. If you requested access to your child/dependent s [facility] account or you have been granted access to another adult s [employer] [facility] account, instructions for viewing their information are also on the following page.     For questions or technical assistance, call [phone].    Thank you for choosing [employer] activation instructions     Activation code: [email or web address]  Expires: 2017  4:53 PM     Step 1: Go to [email or web address]     Step 2: Click on Enter your activation code.     Step 3: Type in your activation code (provided above), the last four digits of your Social Security number (SSN) and date of birth. This information is only required once. The next time you sign in to your account you will only need your username and password. If you receive an error that states \"Invalid Social Security number  or  Invalid date of birth\" that means the information we have on file does not match " the information entered. Please call [phone] for assistance.     Step 4: Choose a username that is easy for you to remember, but hard for others to guess. Your username must:   * be between five and 24 characters   * contain only letters and numbers (no symbols).   Once selected, your username cannot be changed.     Step 5: Choose a password. Your password must:   * be at least eight characters   * contain at least one uppercase letter   * contain one lowercase letter   * contain one number or symbol   * be different than your username.     Step 6: Choose a security question. This will allow you to reset your password.     Step 7: Enter the answer to your security question. The answer cannot be the same as your password.     Step 8: Enter your email address. You will receive email notifications when new information is available in [employer]. You are now ready to start using [employer]!     If you requested proxy access for a child s or another adult s [employer] account, you will be able to access their health record by clicking on their name    Instructions for Child/Dependent Access  To view your child s record, click on your child's name under your name on the right hand side of the screen.   Instructions for Adult Proxy Access  To view your adult proxy record, click on the adult's name under your name on the right hand side of the screen.    In the event you have technical difficulties, please call   [employer] at [phone].

## 2018-08-11 ENCOUNTER — TELEPHONE (OUTPATIENT)
Dept: FAMILY MEDICINE | Facility: OTHER | Age: 18
End: 2018-08-11

## 2018-08-11 ENCOUNTER — OFFICE VISIT (OUTPATIENT)
Dept: FAMILY MEDICINE | Facility: OTHER | Age: 18
End: 2018-08-11
Attending: NURSE PRACTITIONER
Payer: COMMERCIAL

## 2018-08-11 VITALS
HEIGHT: 65 IN | TEMPERATURE: 98.5 F | DIASTOLIC BLOOD PRESSURE: 72 MMHG | HEART RATE: 68 BPM | BODY MASS INDEX: 21.25 KG/M2 | SYSTOLIC BLOOD PRESSURE: 108 MMHG | WEIGHT: 127.56 LBS

## 2018-08-11 DIAGNOSIS — R39.9 UTI SYMPTOMS: ICD-10-CM

## 2018-08-11 DIAGNOSIS — R39.89 URINARY PROBLEM: ICD-10-CM

## 2018-08-11 DIAGNOSIS — N30.01 ACUTE CYSTITIS WITH HEMATURIA: Primary | ICD-10-CM

## 2018-08-11 DIAGNOSIS — J06.9 VIRAL URI WITH COUGH: ICD-10-CM

## 2018-08-11 LAB
ALBUMIN UR-MCNC: ABNORMAL MG/DL
APPEARANCE UR: ABNORMAL
BACTERIA #/AREA URNS HPF: ABNORMAL /HPF
BILIRUB UR QL STRIP: ABNORMAL
COLOR UR AUTO: ABNORMAL
GLUCOSE UR STRIP-MCNC: ABNORMAL MG/DL
HGB UR QL STRIP: ABNORMAL
KETONES UR STRIP-MCNC: ABNORMAL MG/DL
LEUKOCYTE ESTERASE UR QL STRIP: ABNORMAL
NITRATE UR QL: ABNORMAL
NON-SQ EPI CELLS #/AREA URNS LPF: ABNORMAL /LPF
PH UR STRIP: ABNORMAL PH (ref 5–7)
RBC #/AREA URNS AUTO: ABNORMAL /HPF
SOURCE: ABNORMAL
SP GR UR STRIP: ABNORMAL (ref 1–1.03)
UROBILINOGEN UR STRIP-ACNC: ABNORMAL EU/DL (ref 0.2–1)
WBC #/AREA URNS AUTO: ABNORMAL /HPF

## 2018-08-11 PROCEDURE — 81001 URINALYSIS AUTO W/SCOPE: CPT | Performed by: NURSE PRACTITIONER

## 2018-08-11 PROCEDURE — 99214 OFFICE O/P EST MOD 30 MIN: CPT | Performed by: NURSE PRACTITIONER

## 2018-08-11 PROCEDURE — 87086 URINE CULTURE/COLONY COUNT: CPT | Performed by: NURSE PRACTITIONER

## 2018-08-11 RX ORDER — NITROFURANTOIN 25; 75 MG/1; MG/1
100 CAPSULE ORAL 2 TIMES DAILY
Qty: 10 CAPSULE | Refills: 0 | Status: SHIPPED | OUTPATIENT
Start: 2018-08-11 | End: 2018-08-16

## 2018-08-11 NOTE — PROGRESS NOTES
"HPI:    Sneha Bassett is a 17 year old female  who presents to clinic today for UTI concern.    States symptoms of dysuria, frequency, and urgency started yesterday morning.  This morning she noted blood in the urine.  Reports hx of UTIs.    Afebrile in clinic.  Reports tactile fever and chills.  No nausea or vomiting.  Decreased appetite.  No abdominal pain. No back pain.  She has a copper IUD in place and wants it removed - she will be referred to her PCP.  She has STD concerns as her current partner has been sleeping around.  Condom use part of time, not consistently.  No current vaginal discharge.    States she also has an URI for the past few days including stuffy nose, sore throat, and cough.  Throat painful to swallow and irritated with coughing.  Coughing up some colored phlegm.  Feeling winded with stairs.  Bilateral ear pain for the past 5 days.  Decreased energy, low.  Headaches.  Body aches.      Took Azo this morning. Taking Dayquil and Nyquil.           Past Medical History:   Diagnosis Date     Abdominal pain     2/22/2016     Chlamydia 2017     Presence of (intrauterine) contraceptive device     08/2017,copper     Past Surgical History:   Procedure Laterality Date     TYMPANOSTOMY, LOCAL/TOPICAL ANESTHESIA  2006 1/2006,PE tube placement     Social History   Substance Use Topics     Smoking status: Never Smoker     Smokeless tobacco: Never Used     Alcohol use 0.0 oz/week     Current Outpatient Prescriptions   Medication Sig Dispense Refill     COPPER PO 1 Device by Intrauterine route       No Known Allergies      Past medical history, past surgical history, current medications and allergies reviewed and accurate to the best of my knowledge.        ROS:  Refer to HPI    /72 (BP Location: Left arm, Patient Position: Sitting, Cuff Size: Adult Regular)  Pulse 68  Temp 98.5  F (36.9  C) (Tympanic)  Ht 5' 5\" (1.651 m)  Wt 127 lb 9 oz (57.9 kg)  Breastfeeding? No  BMI 21.23 " kg/m2    EXAM:  General Appearance: Well appearing adolescent female, appropriate appearance for age. No acute distress  Head: normocephalic, atraumatic  Ears: Left TM grey, translucent with bony landmarks appreciated, no erythema, mild serous effusion, no bulging, no purulence.  Right TM grey, translucent with bony landmarks appreciated, no erythema, mild serous effusion, no bulging, no purulence.  Left auditory canal clear.  Right auditory canal clear.  Normal external ears, non tender.  Eyes: conjunctivae normal without erythema or irritation, no drainage or crusting, no eyelid swelling, pupils equal   Orophayrnx: moist mucous membranes, posterior pharynx with mild erythema, tonsils without hypertrophy, no erythema, no exudates or petechiae, no post nasal drip seen, no trismus.    Nose: congestion present  Neck: supple without adenopathy  Respiratory: normal chest wall and respirations.  Normal effort.  Clear to auscultation bilaterally, no wheezing, crackles or rhonchi.  No increased work of breathing.  Mild nonproductive cough appreciated.  Cardiac: RRR with no murmurs  Abdomen: soft, nontender, no masses or organomegally, no rebound tenderness or guarding, normal bowel sounds present  : Generalized suprapubic tenderness to palpation.  No CVA tenderness to palpation.    Musculoskeletal:  Normal gait.  Equal movement of bilateral upper extremities.  Equal movement of bilateral lower extremities.    Psychological: normal affect, alert and pleasant      Labs:  Results for orders placed or performed in visit on 08/11/18   Urinalysis w Reflex Microscopic If Positive   Result Value Ref Range    Color Urine Orange     Appearance Urine Unable to assay due to interfering substance     Glucose Urine Unable to assay due to interfering substance (A) NEG^Negative mg/dL    Bilirubin Urine Unable to assay due to interfering substance (A) NEG^Negative    Ketones Urine Unable to assay due to interfering substance (A)  NEG^Negative mg/dL    Specific Gravity Urine Unable to assay due to interfering substance 1.003 - 1.035    Blood Urine Unable to assay due to interfering substance (A) NEG^Negative    pH Urine Unable to assay due to interfering substance 5.0 - 7.0 pH    Protein Albumin Urine Unable to assay due to interfering substance (A) NEG^Negative mg/dL    Urobilinogen Urine Unable to assay due to interfering substance 0.2 - 1.0 EU/dL    Nitrite Urine Unable to assay due to interfering substance (A) NEG^Negative    Leukocyte Esterase Urine Unable to assay due to interfering substance (A) NEG^Negative    Source Midstream Urine    Urine Microscopic   Result Value Ref Range    WBC Urine 25-50 (A) OTO5^0 - 5 /HPF    RBC Urine 5-10 (A) OTO2^O - 2 /HPF    Squamous Epithelial /LPF Urine Moderate (A) FEW^Few /LPF    Bacteria Urine Moderate (A) NEG^Negative /HPF             ASSESSMENT/PLAN:    ICD-10-CM    1. Acute cystitis with hematuria N30.01 nitroFURantoin, macrocrystal-monohydrate, (MACROBID) 100 MG capsule   2. Urinary problem R39.89 Urinalysis w Reflex Microscopic If Positive     Urine Microscopic   3. UTI symptoms R39.9 Urine Culture Aerobic Bacterial   4. Viral URI with cough J06.9     B97.89        UTI:  Urinalysis - few RBCs, moderate WBCs, moderate bacteria    Urine culture pending    Macrobid 100 mg BID x 5 days     Encouraged fluids and frequent bladder emptying.  May use Pyridium OTC PRN.     Patient is interested in getting STI testing however we are not able to obtain a dirty urine sample for the GC testing and patient declines vaginal swab testing today.  Patient was instructed to return tomorrow to provide appropriate urine sample and she is agreeable with this plan.    Call or return to clinic PRN if these symptoms worsen or fail to improve as anticipated.     Will call if culture warrants change of abx.     Discussed warning signs/symptoms indicative of need to f/u    Follow up if symptoms persist or worsen or  concerns      URI:  Discussed symptoms and exam consistent with viral illness.  Discussed symptomatic treatment including salt water gargles, honey, lozenges, saline nasal spray, humidifier, elevation, etc.  May use over-the-counter medications as indicated.  Follow-up if symptoms persist or worsen or concerns

## 2018-08-11 NOTE — NURSING NOTE
Patient presents to the clinic for burning with urination and urinary frequency that started yesterday morning. Patient reports having a history of UTIs and states she had blood in her urine this morning.  Melania ZHAO CMA.......8/11/2018..11:03 AM

## 2018-08-11 NOTE — MR AVS SNAPSHOT
After Visit Summary   8/11/2018    Sneha Bassett    MRN: [id number]           Patient Information     Date Of Birth          2000        Visit Information        Provider Department      8/11/2018 10:45 AM Jadyn Carpenter NP [facility]        Today's Diagnoses     Acute cystitis with hematuria    -  1    Urinary problem        UTI symptoms        Viral URI with cough          Care Instructions    Macrobid twice daily x 5 days    Return for STI testing    Drink plenty of fluids    Follow up if symptoms persist, worsening, or concerns        Symptoms of URI likely due to virus. No antibiotic is needed at this time.       Most coughs are caused by a viral infection.   Usually coughs can last 2 to 3 weeks.     Most sore throats are caused by viruses and are part of a cold. About 10% of sore throats are caused by strep bacteria.    Encouraged fluids and rest.    May use symptomatic care with tylenol or ibuprofen.     Using a humidifier works well to break up the congestion.     Elevate the mattress to 15 degrees in order to help with the congestion.    Frequent swallows of cool liquid.      Oatmeal or honey coats the throat and some patients find it soothes the pain.     Salt water gargles as needed - if old enough to be appropriate    Return to clinic with change/worsening of symptoms or concerns.          Follow-ups after your visit        Who to contact     If you have questions or need follow up information about today's clinic visit or your schedule please contact [facility] directly at [phone].  Normal or non-critical lab and imaging results will be communicated to you by MyChart, letter or phone within 4 business days after the clinic has received the results. If you do not hear from us within 7 days, please contact the clinic through MyChart or phone. If you have a critical or abnormal lab result, we will notify you by phone as soon as  "possible.  Submit refill requests through [employer] or call your pharmacy and they will forward the refill request to us. Please allow 3 business days for your refill to be completed.          Additional Information About Your Visit        [employer] Information     [employer] lets you send messages to your doctor, view your test results, renew your prescriptions, schedule appointments and more. To sign up, go to [email or web address] . Click on \"Log in\" on the left side of the screen, which will take you to the Welcome page. Then click on \"Sign up Now\" on the right side of the page.     You will be asked to enter the access code listed below, as well as some personal information. Please follow the directions to create your username and password.     Your access code is: [email or web address]  Expires: 2018  3:39 PM     Your access code will  in 90 days. If you need help or a new code, please call your [place] clinic or [phone].        Care EveryWhere ID     This is your Care EveryWhere ID. This could be used by other organizations to access your [place] medical records  [id number]        Your Vitals Were     Pulse Temperature Height Breastfeeding? BMI (Body Mass Index)       68 98.5  F (36.9  C) (Tympanic) 5' 5\" (1.651 m) No 21.23 kg/m2        Blood Pressure from Last 3 Encounters:   18 108/72   18 112/70   18 110/66    Weight from Last 3 Encounters:   18 127 lb 9 oz (57.9 kg) (50 %)*   18 136 lb (61.7 kg) (65 %)*   18 135 lb (61.2 kg) (63 %)*     * Growth percentiles are based on CDC 2-20 Years data.              We Performed the Following     Urinalysis w Reflex Microscopic If Positive     Urine Culture Aerobic Bacterial     Urine Microscopic          Today's Medication Changes          These changes are accurate as of 18 11:38 AM.  If you have any questions, ask your nurse or doctor.               Start taking these medicines.        Dose/Directions    nitroFURantoin " (macrocrystal-monohydrate) 100 MG capsule   Commonly known as:  MACROBID   Used for:  Acute cystitis with hematuria   Started by:  Jadyn Carpenter NP        Dose:  100 mg   Take 1 capsule (100 mg) by mouth 2 times daily for 5 days   Quantity:  10 capsule   Refills:  0            Where to get your medicines      These medications were sent to [id number] - [place], [place] - [address]  [address], [facility] [address]     Phone:  [phone]     nitroFURantoin (macrocrystal-monohydrate) 100 MG capsule                Primary Care Provider Office Phone # Fax #    Maria Eugenia TYREL Cho [phone]       [address]  [facility] [address]        Equal Access to Services     [facility] : Hadii barbara carrera hadasho Soomaali, waaxda luqadaha, qaybta kaalmada adeegyada, marielos ramirez . So [facility] [phone].    ATENCIÓN: Si habla español, tiene a banks disposición servicios gratuitos de asistencia lingüística. [facility] [phone].    We comply with applicable federal civil rights laws and [place] laws. We do not discriminate on the basis of race, color, national origin, age, disability, sex, sexual orientation, or gender identity.            Thank you!     Thank you for choosing [facility]  for your care. Our goal is always to provide you with excellent care. Hearing back from our patients is one way we can continue to improve our services. Please take a few minutes to complete the written survey that you may receive in the mail after your visit with us. Thank you!             Your Updated Medication List - Protect others around you: Learn how to safely use, store and throw away your medicines at www.disposemymeds.org.          This list is accurate as of 8/11/18 11:38 AM.  Always use your most recent med list.                   Brand Name Dispense Instructions for use Diagnosis    COPPER PO       1 Device by Intrauterine route        nitroFURantoin (macrocrystal-monohydrate) 100 MG capsule    MACROBID    10 capsule    Take 1 capsule (100 mg) by mouth 2 times daily for 5 days    Acute cystitis with hematuria

## 2018-08-11 NOTE — PATIENT INSTRUCTIONS
Macrobid twice daily x 5 days    Return for STI testing    Drink plenty of fluids    Follow up if symptoms persist, worsening, or concerns        Symptoms of URI likely due to virus. No antibiotic is needed at this time.       Most coughs are caused by a viral infection.   Usually coughs can last 2 to 3 weeks.     Most sore throats are caused by viruses and are part of a cold. About 10% of sore throats are caused by strep bacteria.    Encouraged fluids and rest.    May use symptomatic care with tylenol or ibuprofen.     Using a humidifier works well to break up the congestion.     Elevate the mattress to 15 degrees in order to help with the congestion.    Frequent swallows of cool liquid.      Oatmeal or honey coats the throat and some patients find it soothes the pain.     Salt water gargles as needed - if old enough to be appropriate    Return to clinic with change/worsening of symptoms or concerns.

## 2018-08-11 NOTE — TELEPHONE ENCOUNTER
Patient stopped in  Saturday and requested her IUD be removed during visit. Advised patient that our  providers don't remove IUDs. Patient states she is only visiting and is leaving again on Tuesday, so requested to see primary doctor PCJ Monday for removal. Patient states she has gone to other clinics closer to where she lives but they refuse to remove since they did not place. Please advise if PCJ can fit patient in on Monday for removal. Patient does NOT want another IUD placed.    Alma Ty on 8/11/2018 at 10:54 AM

## 2018-08-13 ENCOUNTER — OFFICE VISIT (OUTPATIENT)
Dept: FAMILY MEDICINE | Facility: OTHER | Age: 18
End: 2018-08-13
Attending: FAMILY MEDICINE
Payer: COMMERCIAL

## 2018-08-13 VITALS
DIASTOLIC BLOOD PRESSURE: 88 MMHG | TEMPERATURE: 99.6 F | SYSTOLIC BLOOD PRESSURE: 120 MMHG | HEART RATE: 84 BPM | WEIGHT: 129.2 LBS | BODY MASS INDEX: 21.5 KG/M2

## 2018-08-13 DIAGNOSIS — Z30.8 ENCOUNTER FOR OTHER CONTRACEPTIVE MANAGEMENT: ICD-10-CM

## 2018-08-13 DIAGNOSIS — Z30.432 ENCOUNTER FOR IUD REMOVAL: Primary | ICD-10-CM

## 2018-08-13 PROBLEM — Z97.5 IUD (INTRAUTERINE DEVICE) IN PLACE: Status: RESOLVED | Noted: 2017-08-01 | Resolved: 2018-08-13

## 2018-08-13 LAB
BACTERIA SPEC CULT: NO GROWTH
SPECIMEN SOURCE: NORMAL

## 2018-08-13 PROCEDURE — 99212 OFFICE O/P EST SF 10 MIN: CPT | Mod: 25 | Performed by: FAMILY MEDICINE

## 2018-08-13 PROCEDURE — 58301 REMOVE INTRAUTERINE DEVICE: CPT | Performed by: FAMILY MEDICINE

## 2018-08-13 ASSESSMENT — PAIN SCALES - GENERAL: PAINLEVEL: MILD PAIN (3)

## 2018-08-13 NOTE — TELEPHONE ENCOUNTER
Patient is coming in at 1:30 today to see Maria Eugenia Fall MD for IUD removal.  Nikole Sin............................... 8/13/2018 11:54 AM

## 2018-08-13 NOTE — PROGRESS NOTES
IUD Removal:  SUBJECTIVE:  Nursing Notes:   Cee Beck, LPN  8/13/2018  1:46 PM  Signed  Patient is here today to have her IUD removed. She said she has been having a lot more cramping with her period and other times of the month also. Cee Beck ANJELICA......................8/13/2018 1:25 PM        Sneha Bassett is a 17 year old female,No obstetric history on file., Patient's last menstrual period was 07/23/2018. who presents today for IUD removal. Her current IUD was placed 1 ago. She has had problems including pain with the IUD. She requests removal of the IUD because she wants to change her method of contraception and will be using abstinence/condoms.    Today's PHQ-2 Score:   PHQ-2 ( 1999 [employer]) 8/13/2018   Q1: Little interest or pleasure in doing things 0   Q2: Feeling down, depressed or hopeless 0   PHQ-2 Score 0       PROCEDURE:    A speculum exam was performed and the cervix was visualized. The IUD string was visualized. Using ring forceps, the string  was grasped and the IUD removed intact.    POST PROCEDURE:    The patient tolerated the procedure well with minimal discomfort. Patient was discharged in stable condition.    Call if bleeding, pain or fever occur., Birth control counseling given. and Condoms and abstinence for birth control at this time.    PETER BHATT MD

## 2018-08-13 NOTE — MR AVS SNAPSHOT
"              After Visit Summary   2018    Sneha Bassett    MRN: [id number]           Patient Information     Date Of Birth          2000        Visit Information        Provider Department      2018 1:30 PM Maria Eugenia Bai MD [facility]        Today's Diagnoses     Encounter for IUD removal    -  1    Encounter for other contraceptive management           Follow-ups after your visit        Who to contact     If you have questions or need follow up information about today's clinic visit or your schedule please contact [facility] directly at [phone].  Normal or non-critical lab and imaging results will be communicated to you by [employer]hart, letter or phone within 4 business days after the clinic has received the results. If you do not hear from us within 7 days, please contact the clinic through [employer]hart or phone. If you have a critical or abnormal lab result, we will notify you by phone as soon as possible.  Submit refill requests through [employer] or call your pharmacy and they will forward the refill request to us. Please allow 3 business days for your refill to be completed.          Additional Information About Your Visit        MyChart Information     [employer] lets you send messages to your doctor, view your test results, renew your prescriptions, schedule appointments and more. To sign up, go to [email or web address] . Click on \"Log in\" on the left side of the screen, which will take you to the Welcome page. Then click on \"Sign up Now\" on the right side of the page.     You will be asked to enter the access code listed below, as well as some personal information. Please follow the directions to create your username and password.     Your access code is: [email or web address]  Expires: 2018  3:39 PM     Your access code will  in 90 days. If you need help or a new code, please call your [place] clinic or [phone].        Care " EveryWhere ID     This is your Care EveryWhere ID. This could be used by other organizations to access your [facility] medical records  [id number]        Your Vitals Were     Pulse Temperature Last Period Breastfeeding? BMI (Body Mass Index)       84 99.6  F (37.6  C) (Tympanic) 07/23/2018 No 21.5 kg/m2        Blood Pressure from Last 3 Encounters:   08/13/18 120/88   08/11/18 108/72   06/13/18 112/70    Weight from Last 3 Encounters:   08/13/18 129 lb 3.2 oz (58.6 kg) (53 %)*   08/11/18 127 lb 9 oz (57.9 kg) (50 %)*   06/13/18 136 lb (61.7 kg) (65 %)*     * Growth percentiles are based on [facility] 2-20 Years data.              We Performed the Following     REMOVE INTRAUTERINE DEVICE        Primary Care Provider Office Phone # Fax #    Maria Eugenia TYREL Cho [phone]       [address] [facility] [address]        Equal Access to Services     [facility]: Hadii barbara espinoza Sosheryl, waaxda luqadaha, qaybta kaalmatanika drew, marielos ramirez . So [facility] [phone].    ATENCIÓN: Si habla español, tiene a banks disposición servicios gratuitos de asistencia lingüística. Llame al [phone].    We comply with applicable federal civil rights laws and [place] laws. We do not discriminate on the basis of race, color, national origin, age, disability, sex, sexual orientation, or gender identity.            Thank you!     Thank you for choosing [facility] AND \[facility]\""  for your care. Our goal is always to provide you with excellent care. Hearing back from our patients is one way we can continue to improve our services. Please take a few minutes to complete the written survey that you may receive in the mail after your visit with us. Thank you!             Your Updated Medication List - Protect others around you: Learn how to safely use, store and throw away your medicines at www.disposemymeds.org.          This list is accurate as of 8/13/18  2:09 PM.   Always use your most recent med list.                   Brand Name Dispense Instructions for use Diagnosis    COPPER PO      1 Device by Intrauterine route        nitroFURantoin (macrocrystal-monohydrate) 100 MG capsule    MACROBID    10 capsule    Take 1 capsule (100 mg) by mouth 2 times daily for 5 days    Acute cystitis with hematuria

## 2018-08-13 NOTE — NURSING NOTE
Patient is here today to have her IUD removed. She said she has been having a lot more cramping with her period and other times of the month also. Cee Beck LPN......................8/13/2018 1:25 PM

## 2018-08-13 NOTE — TELEPHONE ENCOUNTER
Spoke with patient she is wanting to know if Maria Eugenia Fall MD can see her today to get her IUD removed. She said she now lives in [place] and a provider there wouldn't take it out without a consultation first. Please advise. Cee Beck LPN......................8/13/2018 10:36 AM

## 2018-08-13 NOTE — TELEPHONE ENCOUNTER
Ok for work in appointment this afternoon.  Please let her know it would be for IUD removal only.  Maria Eugenia Fall MD

## 2018-10-02 ENCOUNTER — ALLIED HEALTH/NURSE VISIT (OUTPATIENT)
Dept: FAMILY MEDICINE | Facility: OTHER | Age: 18
End: 2018-10-02
Attending: FAMILY MEDICINE
Payer: COMMERCIAL

## 2018-10-02 DIAGNOSIS — Z23 NEED FOR PROPHYLACTIC VACCINATION AND INOCULATION AGAINST INFLUENZA: Primary | ICD-10-CM

## 2018-10-02 PROCEDURE — 90686 IIV4 VACC NO PRSV 0.5 ML IM: CPT

## 2018-10-02 PROCEDURE — 90471 IMMUNIZATION ADMIN: CPT

## 2018-10-02 NOTE — PROGRESS NOTES

## 2018-10-02 NOTE — MR AVS SNAPSHOT
After Visit Summary   10/2/2018    Sneha Bassett    MRN: [id number]           Patient Information     Date Of Birth          2000        Visit Information        Provider Department      10/2/2018 4:00 PM [facility]        Today's Diagnoses     Need for prophylactic vaccination and inoculation against influenza    -  1       Follow-ups after your visit        Your next 10 appointments already scheduled     Oct 02, 2018  4:00 PM CDT   Nurse Only with [facility] and [facility] ([facility] [place])    [address]  [place] [address] [phone]              Who to contact     If you have questions or need follow up information about today's clinic visit or your schedule please contact [facility] directly at [phone].  Normal or non-critical lab and imaging results will be communicated to you by MyChart, letter or phone within 4 business days after the clinic has received the results. If you do not hear from us within 7 days, please contact the clinic through MyChart or phone. If you have a critical or abnormal lab result, we will notify you by phone as soon as possible.  Submit refill requests through [employer] or call your pharmacy and they will forward the refill request to us. Please allow 3 business days for your refill to be completed.          Additional Information About Your Visit        Care EveryWhere ID     This is your Care EveryWhere ID. This could be used by other organizations to access your [facility] medical records  [id number]         Blood Pressure from Last 3 Encounters:   08/13/18 120/88   08/11/18 108/72   06/13/18 112/70    Weight from Last 3 Encounters:   08/13/18 129 lb 3.2 oz (58.6 kg) (53 %)*   08/11/18 127 lb 9 oz (57.9 kg) (50 %)*   06/13/18 136 lb (61.7 kg) (65 %)*     * Growth percentiles are based on CDC 2-20 Years data.              We Performed the Following      HC FLU VAC PRESRV FREE QUAD SPLIT VIR 3+YRS IM        Primary Care Provider Office Phone # Fax #    Maria Eugenia SARAH Gilberto Cho [phone] [address]    [facility] [address]        Equal Access to Services     WILYLC DEBI : Hadii aad ku hadmitcho Soomaali, waaxda luqadaha, qaybta kaalmada adeegyada, waxelda shelleyin haykarynn adedeb madrigalpoolkenya lindo. So [facility] [phone].    ATENCIÓN: Si habla español, tiene a banks disposición servicios gratuitos de asistencia lingüística. Llame al [phone].    We comply with applicable federal civil rights laws and [place] laws. We do not discriminate on the basis of race, color, national origin, age, disability, sex, sexual orientation, or gender identity.            Thank you!     Thank you for choosing [facility] AND [facility]  for your care. Our goal is always to provide you with excellent care. Hearing back from our patients is one way we can continue to improve our services. Please take a few minutes to complete the written survey that you may receive in the mail after your visit with us. Thank you!             Your Updated Medication List - Protect others around you: Learn how to safely use, store and throw away your medicines at www.disposemymeds.org.          This list is accurate as of 10/2/18  3:51 PM.  Always use your most recent med list.                   Brand Name Dispense Instructions for use Diagnosis    COPPER PO      1 Device by Intrauterine route

## 2019-04-01 ENCOUNTER — OFFICE VISIT (OUTPATIENT)
Dept: FAMILY MEDICINE | Facility: OTHER | Age: 19
End: 2019-04-01
Attending: FAMILY MEDICINE
Payer: COMMERCIAL

## 2019-04-01 VITALS
DIASTOLIC BLOOD PRESSURE: 80 MMHG | BODY MASS INDEX: 23.16 KG/M2 | TEMPERATURE: 97.9 F | HEART RATE: 76 BPM | WEIGHT: 139 LBS | RESPIRATION RATE: 14 BRPM | SYSTOLIC BLOOD PRESSURE: 116 MMHG | HEIGHT: 65 IN

## 2019-04-01 DIAGNOSIS — Z00.00 PHYSICAL EXAM, ANNUAL: Primary | ICD-10-CM

## 2019-04-01 DIAGNOSIS — R53.83 FATIGUE, UNSPECIFIED TYPE: ICD-10-CM

## 2019-04-01 DIAGNOSIS — Z71.84 TRAVEL ADVICE ENCOUNTER: ICD-10-CM

## 2019-04-01 DIAGNOSIS — L71.9 ROSACEA: ICD-10-CM

## 2019-04-01 LAB
C TRACH DNA SPEC QL PROBE+SIG AMP: NOT DETECTED
HGB BLD-MCNC: 13.8 G/DL (ref 11.7–15.7)
N GONORRHOEA DNA SPEC QL PROBE+SIG AMP: NOT DETECTED
SPECIMEN SOURCE: NORMAL
TSH SERPL DL<=0.05 MIU/L-ACNC: 1.9 IU/ML (ref 0.34–5.6)

## 2019-04-01 PROCEDURE — 85018 HEMOGLOBIN: CPT | Performed by: FAMILY MEDICINE

## 2019-04-01 PROCEDURE — 87591 N.GONORRHOEAE DNA AMP PROB: CPT | Performed by: FAMILY MEDICINE

## 2019-04-01 PROCEDURE — 36415 COLL VENOUS BLD VENIPUNCTURE: CPT | Performed by: FAMILY MEDICINE

## 2019-04-01 PROCEDURE — 99212 OFFICE O/P EST SF 10 MIN: CPT | Mod: 25 | Performed by: FAMILY MEDICINE

## 2019-04-01 PROCEDURE — 84443 ASSAY THYROID STIM HORMONE: CPT | Performed by: FAMILY MEDICINE

## 2019-04-01 PROCEDURE — 99395 PREV VISIT EST AGE 18-39: CPT | Mod: 25 | Performed by: FAMILY MEDICINE

## 2019-04-01 PROCEDURE — 90691 TYPHOID VACCINE IM: CPT | Performed by: FAMILY MEDICINE

## 2019-04-01 PROCEDURE — 87491 CHLMYD TRACH DNA AMP PROBE: CPT | Performed by: FAMILY MEDICINE

## 2019-04-01 PROCEDURE — 90471 IMMUNIZATION ADMIN: CPT | Performed by: FAMILY MEDICINE

## 2019-04-01 RX ORDER — METRONIDAZOLE 7.5 MG/G
GEL TOPICAL 2 TIMES DAILY
Qty: 45 G | Refills: 3 | Status: SHIPPED | OUTPATIENT
Start: 2019-04-01 | End: 2019-10-10

## 2019-04-01 RX ORDER — ATOVAQUONE AND PROGUANIL HYDROCHLORIDE 250; 100 MG/1; MG/1
1 TABLET, FILM COATED ORAL DAILY
Qty: 37 TABLET | Refills: 0 | Status: SHIPPED | OUTPATIENT
Start: 2019-04-01 | End: 2019-06-06

## 2019-04-01 ASSESSMENT — PAIN SCALES - GENERAL: PAINLEVEL: NO PAIN (0)

## 2019-04-01 ASSESSMENT — MIFFLIN-ST. JEOR: SCORE: 1401.38

## 2019-04-01 NOTE — NURSING NOTE
Patient presents to the clinic today for px.    Medication Reconciliation: complete     Kareen Abdalla LPN.................. 4/1/2019 9:02 AM

## 2019-04-01 NOTE — PATIENT INSTRUCTIONS
Malaria treatment  - continue a week after you return.    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      Typhoid done.  Hepatitis A and B vaccines are up to date.

## 2019-06-06 ENCOUNTER — OFFICE VISIT (OUTPATIENT)
Dept: FAMILY MEDICINE | Facility: OTHER | Age: 19
End: 2019-06-06
Attending: NURSE PRACTITIONER
Payer: COMMERCIAL

## 2019-06-06 VITALS
BODY MASS INDEX: 22.6 KG/M2 | DIASTOLIC BLOOD PRESSURE: 66 MMHG | RESPIRATION RATE: 16 BRPM | TEMPERATURE: 97.6 F | SYSTOLIC BLOOD PRESSURE: 128 MMHG | WEIGHT: 135.8 LBS | HEART RATE: 60 BPM

## 2019-06-06 DIAGNOSIS — R53.83 MALAISE AND FATIGUE: ICD-10-CM

## 2019-06-06 DIAGNOSIS — Z78.9 HISTORY OF RECENT FOREIGN TRAVEL: ICD-10-CM

## 2019-06-06 DIAGNOSIS — B34.9 VIRAL ILLNESS: ICD-10-CM

## 2019-06-06 DIAGNOSIS — G47.25 JET LAG SYNDROME: Primary | ICD-10-CM

## 2019-06-06 DIAGNOSIS — R07.0 THROAT PAIN: ICD-10-CM

## 2019-06-06 DIAGNOSIS — R53.81 MALAISE AND FATIGUE: ICD-10-CM

## 2019-06-06 LAB
ALBUMIN UR-MCNC: NEGATIVE MG/DL
ANION GAP SERPL CALCULATED.3IONS-SCNC: 7 MMOL/L (ref 3–14)
APPEARANCE UR: CLEAR
BASOPHILS # BLD AUTO: 0 10E9/L (ref 0–0.2)
BASOPHILS NFR BLD AUTO: 0.4 %
BILIRUB UR QL STRIP: NEGATIVE
BUN SERPL-MCNC: 13 MG/DL (ref 7–25)
CALCIUM SERPL-MCNC: 9.5 MG/DL (ref 8.6–10.3)
CHLORIDE SERPL-SCNC: 102 MMOL/L (ref 98–107)
CO2 SERPL-SCNC: 30 MMOL/L (ref 21–31)
COLOR UR AUTO: YELLOW
CREAT SERPL-MCNC: 0.84 MG/DL (ref 0.6–1.2)
DEPRECATED S PYO AG THROAT QL EIA: NORMAL
DIFFERENTIAL METHOD BLD: NORMAL
EOSINOPHIL # BLD AUTO: 0.1 10E9/L (ref 0–0.7)
EOSINOPHIL NFR BLD AUTO: 1 %
ERYTHROCYTE [DISTWIDTH] IN BLOOD BY AUTOMATED COUNT: 11.4 % (ref 10–15)
GFR SERPL CREATININE-BSD FRML MDRD: 86 ML/MIN/{1.73_M2}
GLUCOSE SERPL-MCNC: 87 MG/DL (ref 70–105)
GLUCOSE UR STRIP-MCNC: NEGATIVE MG/DL
HCT VFR BLD AUTO: 39.5 % (ref 35–47)
HGB BLD-MCNC: 13.2 G/DL (ref 11.7–15.7)
HGB UR QL STRIP: NEGATIVE
IMM GRANULOCYTES # BLD: 0 10E9/L (ref 0–0.4)
IMM GRANULOCYTES NFR BLD: 0.2 %
KETONES UR STRIP-MCNC: NEGATIVE MG/DL
LEUKOCYTE ESTERASE UR QL STRIP: NEGATIVE
LYMPHOCYTES # BLD AUTO: 1.8 10E9/L (ref 0.8–5.3)
LYMPHOCYTES NFR BLD AUTO: 36.8 %
MCH RBC QN AUTO: 31.5 PG (ref 26.5–33)
MCHC RBC AUTO-ENTMCNC: 33.4 G/DL (ref 31.5–36.5)
MCV RBC AUTO: 94 FL (ref 78–100)
MONOCYTES # BLD AUTO: 0.6 10E9/L (ref 0–1.3)
MONOCYTES NFR BLD AUTO: 11.6 %
NEUTROPHILS # BLD AUTO: 2.5 10E9/L (ref 1.6–8.3)
NEUTROPHILS NFR BLD AUTO: 50 %
NITRATE UR QL: NEGATIVE
PH UR STRIP: 7 PH (ref 5–9)
PLATELET # BLD AUTO: 180 10E9/L (ref 150–450)
POTASSIUM SERPL-SCNC: 4.3 MMOL/L (ref 3.5–5.1)
RBC # BLD AUTO: 4.19 10E12/L (ref 3.8–5.2)
SODIUM SERPL-SCNC: 139 MMOL/L (ref 134–144)
SOURCE: NORMAL
SP GR UR STRIP: <1.005 (ref 1–1.03)
SPECIMEN SOURCE: NORMAL
UROBILINOGEN UR STRIP-ACNC: 0.2 EU/DL (ref 0.2–1)
WBC # BLD AUTO: 5 10E9/L (ref 4–11)

## 2019-06-06 PROCEDURE — 85025 COMPLETE CBC W/AUTO DIFF WBC: CPT | Mod: ZL | Performed by: NURSE PRACTITIONER

## 2019-06-06 PROCEDURE — 87880 STREP A ASSAY W/OPTIC: CPT | Mod: ZL | Performed by: NURSE PRACTITIONER

## 2019-06-06 PROCEDURE — 36415 COLL VENOUS BLD VENIPUNCTURE: CPT | Mod: ZL | Performed by: NURSE PRACTITIONER

## 2019-06-06 PROCEDURE — 81003 URINALYSIS AUTO W/O SCOPE: CPT | Mod: ZL | Performed by: NURSE PRACTITIONER

## 2019-06-06 PROCEDURE — 99214 OFFICE O/P EST MOD 30 MIN: CPT | Performed by: NURSE PRACTITIONER

## 2019-06-06 PROCEDURE — 80048 BASIC METABOLIC PNL TOTAL CA: CPT | Mod: ZL | Performed by: NURSE PRACTITIONER

## 2019-06-06 RX ORDER — ATOVAQUONE AND PROGUANIL HYDROCHLORIDE 250; 100 MG/1; MG/1
TABLET, FILM COATED ORAL
Refills: 0 | COMMUNITY
Start: 2019-05-08 | End: 2019-10-10

## 2019-06-06 ASSESSMENT — PAIN SCALES - GENERAL: PAINLEVEL: NO PAIN (0)
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
## 2019-06-06 NOTE — NURSING NOTE
"Chief Complaint   Patient presents with     Throat Problem     just got back from [place],  vomiting, diarrhea, hot flashes, sore throat, body aches       Initial /66 (BP Location: Right arm, Patient Position: Sitting, Cuff Size: Adult Regular)   Pulse 60   Temp 97.6  F (36.4  C) (Tympanic)   Resp 16   Wt 61.6 kg (135 lb 12.8 oz)   Breastfeeding? No   BMI 22.60 kg/m   Estimated body mass index is 22.6 kg/m  as calculated from the following:    Height as of 4/1/19: 1.651 m (5' 5\").    Weight as of this encounter: 61.6 kg (135 lb 12.8 oz).  Medication Reconciliation: Completed     Yessica Mcclain LPN  "

## 2019-06-06 NOTE — PROGRESS NOTES
HPI:    Sneha Bassett is a 18 year old female  who presents to clinic today for illness after recent foreign travel.    States she traveled to [place] [facility] for 10 days then [facility] for 2 weeks, she left on 5/10/19 and returned 2 days ago.  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  Symptoms include upset stomach, vomiting, frequent diarrhea, for the past 6 days.  Vomiting only occurred x 1 at the onset of symptoms.  Diarrhea has been about twice daily, watery stools.  States she has taken Pepto, Imodium, and .  She took Malaria medication starting 2 days prior to leaving and continues that until 5 days after return home.  Sore throat, body aches, cough, plugged ears, headache and fatigue for the past 4 days.  NO fevers.   Dry cough.  No chest pain.  No painful breathing.    No shortness of breath.  Headache is behind eyes and generalized on top of head.       Taking Ibuprofen for headaches, none today.  Taking Dayquil today.        Past Medical History:   Diagnosis Date     Abdominal pain     2/22/2016     Chlamydia 2017     Past Surgical History:   Procedure Laterality Date     TYMPANOSTOMY, LOCAL/TOPICAL ANESTHESIA  2006 1/2006,PE tube placement     Social History     Tobacco Use     Smoking status: Never Smoker     Smokeless tobacco: Never Used   Substance Use Topics     Alcohol use: Yes     Alcohol/week: 0.0 oz     Current Outpatient Medications   Medication Sig Dispense Refill     atovaquone-proguanil (MALARONE) 250-100 MG tablet TK 1 T PO DAILY. START 2 DAYS B TRAVEL AND. CONT 7 DAYS AFTER RETURN  0     metroNIDAZOLE (METROGEL) 0.75 % external gel Apply topically 2 times daily 45 g 3     No Known Allergies      Past medical history, past surgical history, current medications and allergies reviewed and accurate to the best of my knowledge.        ROS:  Refer to HPI    /66 (BP Location: Right arm, Patient Position: Sitting, Cuff Size: Adult Regular)   Pulse 60   Temp 97.6  F (36.4  C)  (Tympanic)   Resp 16   Wt 61.6 kg (135 lb 12.8 oz)   Breastfeeding? No   BMI 22.60 kg/m      EXAM:  General Appearance: Well appearing but slightly fatigued adolescent female, non toxic appearance, appropriate appearance for age. No acute distress  Head: normocephalic, atraumatic  Ears: Left TM grey, translucent with bony landmarks appreciated, no erythema, no effusion, no bulging, no purulence.  Right TM grey, translucent with bony landmarks appreciated, no erythema, no effusion, no bulging, no purulence.  Left auditory canal clear.  Right auditory canal clear.  Normal external ears, non tender.  Eyes: conjunctivae normal without erythema or irritation, no drainage or crusting, no eyelid swelling, pupils equal   Orophayrnx: moist mucous membranes, posterior pharynx with erythema, tonsils without hypertrophy, mild erythema, no exudates or petechiae, no post nasal drip seen, no trismus, voice clear.    Nose: no drainage or congestion   Neck: supple without adenopathy  Respiratory: normal chest wall and respirations.  Normal effort.  Clear to auscultation bilaterally, no wheezing, crackles or rhonchi.  No increased work of breathing.  No cough appreciated.   Cardiac: RRR with no murmurs  Abdomen: soft, nontender, no masses or organomegally, no rebound tenderness or guarding, normal bowel sounds present  :  Mild suprapubic tenderness to palpation.  No CVA tenderness to palpation.    Musculoskeletal:  No cervical spine tenderness to palpation.  Full ROM of neck.  Normal gait.  Equal movement of bilateral upper extremities.  Equal movement of bilateral lower extremities.    Dermatological: no rashes noted of exposed skin  Psychological: normal affect, alert and pleasant      Labs:  Results for orders placed or performed in visit on 06/06/19   *UA reflex to Microscopic   Result Value Ref Range    Color Urine Yellow     Appearance Urine Clear     Glucose Urine Negative NEG^Negative mg/dL    Bilirubin Urine Negative  NEG^Negative    Ketones Urine Negative NEG^Negative mg/dL    Specific Gravity Urine <1.005 1.000 - 1.030    Blood Urine Negative NEG^Negative    pH Urine 7.0 5.0 - 9.0 pH    Protein Albumin Urine Negative NEG^Negative mg/dL    Urobilinogen Urine 0.2 0.2 - 1.0 EU/dL    Nitrite Urine Negative NEG^Negative    Leukocyte Esterase Urine Negative NEG^Negative    Source Midstream Urine    CBC and Differential   Result Value Ref Range    WBC 5.0 4.0 - 11.0 10e9/L    RBC Count 4.19 3.8 - 5.2 10e12/L    Hemoglobin 13.2 11.7 - 15.7 g/dL    Hematocrit 39.5 35.0 - 47.0 %    MCV 94 78 - 100 fl    MCH 31.5 26.5 - 33.0 pg    MCHC 33.4 31.5 - 36.5 g/dL    RDW 11.4 10.0 - 15.0 %    Platelet Count 180 150 - 450 10e9/L    Diff Method Automated Method     % Neutrophils 50.0 %    % Lymphocytes 36.8 %    % Monocytes 11.6 %    % Eosinophils 1.0 %    % Basophils 0.4 %    % Immature Granulocytes 0.2 %    Absolute Neutrophil 2.5 1.6 - 8.3 10e9/L    Absolute Lymphocytes 1.8 0.8 - 5.3 10e9/L    Absolute Monocytes 0.6 0.0 - 1.3 10e9/L    Absolute Eosinophils 0.1 0.0 - 0.7 10e9/L    Absolute Basophils 0.0 0.0 - 0.2 10e9/L    Abs Immature Granulocytes 0.0 0 - 0.4 10e9/L   Basic Metabolic Panel   Result Value Ref Range    Sodium 139 134 - 144 mmol/L    Potassium 4.3 3.5 - 5.1 mmol/L    Chloride 102 98 - 107 mmol/L    Carbon Dioxide 30 21 - 31 mmol/L    Anion Gap 7 3 - 14 mmol/L    Glucose 87 70 - 105 mg/dL    Urea Nitrogen 13 7 - 25 mg/dL    Creatinine 0.84 0.60 - 1.20 mg/dL    GFR Estimate 86 >60 mL/min/[1.73_m2]    GFR Estimate If Black >90 >60 mL/min/[1.73_m2]    Calcium 9.5 8.6 - 10.3 mg/dL   Rapid strep screen   Result Value Ref Range    Specimen Description Throat     Rapid Strep A Screen       Negative presumptive for Group A Beta Streptococcus             ASSESSMENT/PLAN:  1. Throat pain    - Rapid strep screen    Negative rapid strep test    Likely viral illness.    Symptomatic treatment: fluids, salt water gargles, honey, elevation,  humidifier, lozenges, etc   Tylenol or ibuprofen PRN    2. History of recent foreign travel    - CBC and Differential; Future  - CBC and Differential    CBC - normal     - Basic Metabolic Panel; Future  - Basic Metabolic Panel    BMP - normal     - *UA reflex to Microscopic    Urinalysis - normal     Treated with malaria medication so risk of malaria is minimal    Reassurance provided     Discussed warning signs/symptoms indicative of need to f/u    Follow up with PCP if symptoms persist or worsen or concerns    3. Malaise and fatigue    - CBC and Differential; Future  - CBC and Differential    CBC - normal     - Basic Metabolic Panel; Future  - Basic Metabolic Panel    BMP - normal     - *UA reflex to Microscopic    Urinalysis - normal     Discussed warning signs/symptoms indicative of need to f/u    Follow up with PCP if symptoms persist or worsen or concerns    4. Viral illness    Discussed with patient that symptoms and exam are consistent with viral illness.     Symptomatic treatment.    5. Jet lag syndrome    Most likely cause of the majority of her symptoms based on her recent travels, time change, and recent return home to a continued busy schedule not allowing for any time to rest and catch up before she moves next week to [place] to start a new job.      Discussed warning signs/symptoms indicative of need to f/u    Follow up with PCP if symptoms persist or worsen or concerns

## 2019-10-03 ENCOUNTER — ALLIED HEALTH/NURSE VISIT (OUTPATIENT)
Dept: FAMILY MEDICINE | Facility: OTHER | Age: 19
End: 2019-10-03
Attending: FAMILY MEDICINE
Payer: COMMERCIAL

## 2019-10-03 DIAGNOSIS — Z23 NEED FOR PROPHYLACTIC VACCINATION AND INOCULATION AGAINST INFLUENZA: Primary | ICD-10-CM

## 2019-10-03 PROCEDURE — 90471 IMMUNIZATION ADMIN: CPT

## 2019-10-03 PROCEDURE — 90686 IIV4 VACC NO PRSV 0.5 ML IM: CPT

## 2019-10-10 ENCOUNTER — OFFICE VISIT (OUTPATIENT)
Dept: FAMILY MEDICINE | Facility: OTHER | Age: 19
End: 2019-10-10
Attending: FAMILY MEDICINE
Payer: COMMERCIAL

## 2019-10-10 VITALS
HEIGHT: 65 IN | BODY MASS INDEX: 23.63 KG/M2 | SYSTOLIC BLOOD PRESSURE: 102 MMHG | WEIGHT: 141.8 LBS | RESPIRATION RATE: 16 BRPM | TEMPERATURE: 97.8 F | HEART RATE: 60 BPM | DIASTOLIC BLOOD PRESSURE: 80 MMHG

## 2019-10-10 DIAGNOSIS — J02.9 SORE THROAT: Primary | ICD-10-CM

## 2019-10-10 DIAGNOSIS — J06.9 VIRAL UPPER RESPIRATORY TRACT INFECTION: ICD-10-CM

## 2019-10-10 LAB
SPECIMEN SOURCE: NORMAL
STREP GROUP A PCR: NOT DETECTED

## 2019-10-10 PROCEDURE — 87651 STREP A DNA AMP PROBE: CPT | Mod: ZL | Performed by: FAMILY MEDICINE

## 2019-10-10 PROCEDURE — 99213 OFFICE O/P EST LOW 20 MIN: CPT | Performed by: FAMILY MEDICINE

## 2019-10-10 RX ORDER — ACETAMINOPHEN 325 MG/1
650 TABLET ORAL
COMMUNITY
Start: 2019-07-02

## 2019-10-10 RX ORDER — METRONIDAZOLE 7.5 MG/G
GEL TOPICAL
COMMUNITY
Start: 2019-04-01

## 2019-10-10 RX ORDER — CODEINE PHOSPHATE AND GUAIFENESIN 10; 100 MG/5ML; MG/5ML
1-2 SOLUTION ORAL EVERY 4 HOURS PRN
Qty: 180 ML | Refills: 1 | Status: SHIPPED | OUTPATIENT
Start: 2019-10-10

## 2019-10-10 RX ORDER — IBUPROFEN 400 MG/1
400 TABLET, FILM COATED ORAL
COMMUNITY
Start: 2019-07-02

## 2019-10-10 ASSESSMENT — ANXIETY QUESTIONNAIRES
5. BEING SO RESTLESS THAT IT IS HARD TO SIT STILL: NOT AT ALL
3. WORRYING TOO MUCH ABOUT DIFFERENT THINGS: NOT AT ALL
1. FEELING NERVOUS, ANXIOUS, OR ON EDGE: NOT AT ALL
2. NOT BEING ABLE TO STOP OR CONTROL WORRYING: NOT AT ALL
6. BECOMING EASILY ANNOYED OR IRRITABLE: NOT AT ALL
4. TROUBLE RELAXING: NOT AT ALL
7. FEELING AFRAID AS IF SOMETHING AWFUL MIGHT HAPPEN: NOT AT ALL
GAD7 TOTAL SCORE: 0

## 2019-10-10 ASSESSMENT — MIFFLIN-ST. JEOR: SCORE: 1414.08

## 2019-10-10 ASSESSMENT — PAIN SCALES - GENERAL: PAINLEVEL: MILD PAIN (2)

## 2019-10-10 ASSESSMENT — PATIENT HEALTH QUESTIONNAIRE - PHQ9: SUM OF ALL RESPONSES TO PHQ QUESTIONS 1-9: 0

## 2019-10-10 NOTE — PROGRESS NOTES
"  SUBJECTIVE:   Sneha Bassett is a 18 year old female who presents to clinic today for the following health issues: Sore throat chest pain congestion    Patient arrives here for sore throat chest pain congestion.  The cough is been going on about 1 month.  She is also recent developed some congestion nasal discharge.  That is been about a week.  And associate with some chest pain.  She recently came back from [facility].  No fevers or chills.  No exposures that she is aware of        Patient Active Problem List    Diagnosis Date Noted     Ganglion cyst of right foot 05/21/2018     Priority: Medium     Chronic abdominal pain 02/22/2016     Priority: Medium     Acne vulgaris 05/05/2015     Priority: Medium     Backache 01/05/2011     Priority: Medium     Past Medical History:   Diagnosis Date     Abdominal pain     2/22/2016     Chlamydia 2017      Past Surgical History:   Procedure Laterality Date     TYMPANOSTOMY, LOCAL/TOPICAL ANESTHESIA  2006 1/2006,PE tube placement     Current Outpatient Medications   Medication Sig Dispense Refill     acetaminophen (TYLENOL) 325 MG tablet Take 650 mg by mouth       guaiFENesin-codeine (ROBITUSSIN AC) 100-10 MG/5ML solution Take 5-10 mLs by mouth every 4 hours as needed for cough 180 mL 1     ibuprofen (ADVIL/MOTRIN) 400 MG tablet Take 400 mg by mouth       metroNIDAZOLE (METROGEL) 0.75 % external gel        No Known Allergies    Review of Systems     OBJECTIVE:     /80 (BP Location: Right arm, Patient Position: Sitting, Cuff Size: Adult Large)   Pulse 60   Temp 97.8  F (36.6  C)   Resp 16   Ht 1.651 m (5' 5\")   Wt 64.3 kg (141 lb 12.8 oz)   Breastfeeding? No   BMI 23.60 kg/m    Body mass index is 23.6 kg/m .  Physical Exam  HENT:      Right Ear: Tympanic membrane and ear canal normal.      Left Ear: Tympanic membrane and ear canal normal.      Nose:      Comments: Nose shows clearish rhinorrhea     Mouth/Throat:      Mouth: Mucous membranes are moist.   Eyes:    "   Pupils: Pupils are equal, round, and reactive to light.   Cardiovascular:      Rate and Rhythm: Normal rate and regular rhythm.   Pulmonary:      Effort: Pulmonary effort is normal.      Breath sounds: Normal breath sounds.   Abdominal:      General: Abdomen is flat.   Neurological:      Mental Status: She is alert.         Diagnostic Test Results:  Results for orders placed or performed in visit on 10/10/19 (from the past 24 hour(s))   Group A Streptococcus PCR Throat Swab   Result Value Ref Range    Specimen Description Throat     Strep Group A PCR Not Detected NDET^Not Detected       ASSESSMENT/PLAN:         1. Sore throat  Negative strep supportive care  - Group A Streptococcus PCR Throat Swab    2. Viral upper respiratory tract infection  Start Robitussin.  - guaiFENesin-codeine (ROBITUSSIN AC) 100-10 MG/5ML solution; Take 5-10 mLs by mouth every 4 hours as needed for cough  Dispense: 180 mL; Refill: 1    Follow-up if any changes should occur  Elio Villarreal MD  [facility]

## 2019-10-10 NOTE — NURSING NOTE
"Patient presents to the clinic for a sore throat and a chest cold. Karina states for the last week she has been feeling hot, having headaches and a sore throat. The last month has had a \"tickle\".  Medication Reconciliation Completed.    Vamsi Dumont LPN  10/10/2019 1:15 PM  "

## 2019-10-11 ASSESSMENT — ANXIETY QUESTIONNAIRES: GAD7 TOTAL SCORE: 0

## 2019-11-13 ENCOUNTER — OFFICE VISIT (OUTPATIENT)
Dept: FAMILY MEDICINE | Facility: OTHER | Age: 19
End: 2019-11-13
Attending: NURSE PRACTITIONER
Payer: COMMERCIAL

## 2019-11-13 VITALS
DIASTOLIC BLOOD PRESSURE: 78 MMHG | WEIGHT: 143.7 LBS | SYSTOLIC BLOOD PRESSURE: 110 MMHG | TEMPERATURE: 98.4 F | RESPIRATION RATE: 16 BRPM | OXYGEN SATURATION: 99 % | HEART RATE: 66 BPM | HEIGHT: 65 IN | BODY MASS INDEX: 23.94 KG/M2

## 2019-11-13 DIAGNOSIS — N91.2 AMENORRHEA: ICD-10-CM

## 2019-11-13 DIAGNOSIS — Z11.3 SCREEN FOR STD (SEXUALLY TRANSMITTED DISEASE): Primary | ICD-10-CM

## 2019-11-13 LAB
C TRACH DNA SPEC QL PROBE+SIG AMP: NOT DETECTED
HCG UR QL: NEGATIVE
N GONORRHOEA DNA SPEC QL PROBE+SIG AMP: NOT DETECTED
SPECIMEN SOURCE: NORMAL
SPECIMEN SOURCE: NORMAL
WET PREP SPEC: NORMAL

## 2019-11-13 PROCEDURE — 81025 URINE PREGNANCY TEST: CPT | Mod: ZL | Performed by: FAMILY MEDICINE

## 2019-11-13 PROCEDURE — 87491 CHLMYD TRACH DNA AMP PROBE: CPT | Mod: ZL | Performed by: FAMILY MEDICINE

## 2019-11-13 PROCEDURE — 99213 OFFICE O/P EST LOW 20 MIN: CPT | Performed by: FAMILY MEDICINE

## 2019-11-13 PROCEDURE — 87210 SMEAR WET MOUNT SALINE/INK: CPT | Mod: ZL | Performed by: FAMILY MEDICINE

## 2019-11-13 PROCEDURE — 87591 N.GONORRHOEAE DNA AMP PROB: CPT | Mod: ZL | Performed by: FAMILY MEDICINE

## 2019-11-13 ASSESSMENT — PAIN SCALES - GENERAL: PAINLEVEL: MILD PAIN (3)

## 2019-11-13 ASSESSMENT — MIFFLIN-ST. JEOR: SCORE: 1422.7

## 2019-11-13 NOTE — NURSING NOTE
Patient has not been feeling well for 13 days. Their symptoms are vaginal problem and they took plan B on 10/31.   Jadyn Gonzalez LPN LPN....................  11/13/2019   4:42 PM

## 2019-11-13 NOTE — PROGRESS NOTES
"Nursing Notes:   Carlos JadynANJELICA  11/13/2019  4:59 PM  Signed  Patient has not been feeling well for 13 days. Their symptoms are vaginal problem and they took plan B on 10/31.   Jadyn GonzalezANJELICA LPN....................  11/13/2019   4:42 PM      SUBJECTIVE:   18 year old female complains of some abdominal cramping and had a condom break during intercourse on 10/30/2019. She took Plan B the next day. She then had a menses on 11/5/2019 that was normal.   She is not on any other birth control.   Vaginal discharge seems \"sticky and white\" at times and now thinner and clear. No odor.   Has had 2 new sexual partners recently.  Distant history of chlamydia and gonorrhea.     Patient's last menstrual period was 11/05/2019 (exact date).    OBJECTIVE:   Vital signs:  Temp: 98.4  F (36.9  C) Temp src: Tympanic BP: 110/78 Pulse: 66   Resp: 16 SpO2: 99 %     Height: 165.1 cm (5' 5\") Weight: 65.2 kg (143 lb 11.2 oz)  Estimated body mass index is 23.91 kg/m  as calculated from the following:    Height as of this encounter: 1.651 m (5' 5\").    Weight as of this encounter: 65.2 kg (143 lb 11.2 oz).    She appears well, afebrile.  Abdomen: benign, soft, nontender, no masses.  Pelvic Exam: normal vagina and vulva, normal cervix without lesions or tenderness and clear cervical mucous.  Vaginal discharge appears physiologic and white without odor. Wet prep sent.    ASSESSMENT:       PLAN:   GC and chlamydia genprobe swabs sent to lab.  Wet prep sent.  Cervical mucous indicates likely at ovulation which may cause some cramping.  She will be called with lab results.  Consider other birth control. She plans to have exam and PAP after 19st birthday as instructed.  Brianna Wells MD  5:18 PM 11/13/2019       "

## 2019-12-23 ENCOUNTER — HOSPITAL ENCOUNTER (OUTPATIENT)
Dept: GENERAL RADIOLOGY | Facility: OTHER | Age: 19
Discharge: HOME | End: 2019-12-23
Attending: ORTHOPAEDIC SURGERY | Admitting: ORTHOPAEDIC SURGERY
Payer: COMMERCIAL

## 2019-12-23 ENCOUNTER — OFFICE VISIT (OUTPATIENT)
Dept: ORTHOPEDICS | Facility: OTHER | Age: 19
End: 2019-12-23
Attending: ORTHOPAEDIC SURGERY
Payer: COMMERCIAL

## 2019-12-23 DIAGNOSIS — M25.511 RIGHT SHOULDER PAIN, UNSPECIFIED CHRONICITY: Primary | ICD-10-CM

## 2019-12-23 DIAGNOSIS — M25.511 RIGHT SHOULDER PAIN, UNSPECIFIED CHRONICITY: ICD-10-CM

## 2019-12-23 PROCEDURE — G0463 HOSPITAL OUTPT CLINIC VISIT: HCPCS | Mod: 25

## 2019-12-23 PROCEDURE — 73030 X-RAY EXAM OF SHOULDER: CPT | Mod: RT

## 2019-12-31 ENCOUNTER — HOSPITAL ENCOUNTER (OUTPATIENT)
Dept: MRI IMAGING | Facility: OTHER | Age: 19
End: 2019-12-31
Attending: ORTHOPAEDIC SURGERY
Payer: COMMERCIAL

## 2019-12-31 ENCOUNTER — HOSPITAL ENCOUNTER (OUTPATIENT)
Dept: GENERAL RADIOLOGY | Facility: OTHER | Age: 19
Discharge: HOME | End: 2019-12-31
Attending: ORTHOPAEDIC SURGERY | Admitting: ORTHOPAEDIC SURGERY
Payer: COMMERCIAL

## 2019-12-31 DIAGNOSIS — M25.511 RIGHT SHOULDER PAIN, UNSPECIFIED CHRONICITY: ICD-10-CM

## 2019-12-31 PROCEDURE — A9575 INJ GADOTERATE MEGLUMI 0.1ML: HCPCS | Performed by: RADIOLOGY

## 2019-12-31 PROCEDURE — 73222 MRI JOINT UPR EXTREM W/DYE: CPT | Mod: RT

## 2019-12-31 PROCEDURE — 25500064 ZZH RX 255 OP 636: Performed by: RADIOLOGY

## 2019-12-31 PROCEDURE — 23350 INJECTION FOR SHOULDER X-RAY: CPT

## 2019-12-31 PROCEDURE — 25000125 ZZHC RX 250: Performed by: RADIOLOGY

## 2019-12-31 RX ORDER — GADOTERATE MEGLUMINE 376.9 MG/ML
0.1 INJECTION INTRAVENOUS ONCE
Status: COMPLETED | OUTPATIENT
Start: 2019-12-31 | End: 2019-12-31

## 2019-12-31 RX ORDER — LIDOCAINE HYDROCHLORIDE 10 MG/ML
5 INJECTION, SOLUTION INFILTRATION; PERINEURAL ONCE
Status: COMPLETED | OUTPATIENT
Start: 2019-12-31 | End: 2019-12-31

## 2019-12-31 RX ADMIN — GADOTERATE MEGLUMINE 0.1 ML: 376.9 INJECTION INTRAVENOUS at 16:49

## 2019-12-31 RX ADMIN — IOHEXOL 5 ML: 240 INJECTION, SOLUTION INTRATHECAL; INTRAVASCULAR; INTRAVENOUS; ORAL at 16:49

## 2019-12-31 RX ADMIN — LIDOCAINE HYDROCHLORIDE 2 ML: 10 INJECTION, SOLUTION INFILTRATION; PERINEURAL at 16:49
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
## 2020-03-11 ENCOUNTER — HEALTH MAINTENANCE LETTER (OUTPATIENT)
Age: 20
End: 2020-03-11

## 2020-12-27 ENCOUNTER — HEALTH MAINTENANCE LETTER (OUTPATIENT)
Age: 20
End: 2020-12-27

## 2021-10-09 ENCOUNTER — HEALTH MAINTENANCE LETTER (OUTPATIENT)
Age: 21
End: 2021-10-09

## 2022-01-29 ENCOUNTER — HEALTH MAINTENANCE LETTER (OUTPATIENT)
Age: 22
End: 2022-01-29

## 2022-09-17 ENCOUNTER — HEALTH MAINTENANCE LETTER (OUTPATIENT)
Age: 22
End: 2022-09-17

## 2023-05-06 ENCOUNTER — HEALTH MAINTENANCE LETTER (OUTPATIENT)
Age: 23
End: 2023-05-06

## (undated) RX ORDER — LIDOCAINE HYDROCHLORIDE 10 MG/ML
INJECTION, SOLUTION INFILTRATION; PERINEURAL
Status: DISPENSED
Start: 2019-12-31